# Patient Record
Sex: FEMALE | Race: WHITE | Employment: FULL TIME | URBAN - METROPOLITAN AREA
[De-identification: names, ages, dates, MRNs, and addresses within clinical notes are randomized per-mention and may not be internally consistent; named-entity substitution may affect disease eponyms.]

---

## 2017-02-07 ENCOUNTER — OFFICE VISIT (OUTPATIENT)
Dept: PRIMARY CARE CLINIC | Age: 57
End: 2017-02-07

## 2017-02-07 VITALS
BODY MASS INDEX: 24.46 KG/M2 | SYSTOLIC BLOOD PRESSURE: 115 MMHG | RESPIRATION RATE: 16 BRPM | WEIGHT: 124.6 LBS | TEMPERATURE: 98.2 F | DIASTOLIC BLOOD PRESSURE: 74 MMHG | HEART RATE: 101 BPM | OXYGEN SATURATION: 99 % | HEIGHT: 60 IN

## 2017-02-07 DIAGNOSIS — K52.9 ACUTE GASTROENTERITIS: Primary | ICD-10-CM

## 2017-02-07 RX ORDER — ONDANSETRON 4 MG/1
4 TABLET, ORALLY DISINTEGRATING ORAL
Qty: 15 TAB | Refills: 0 | Status: SHIPPED | OUTPATIENT
Start: 2017-02-07 | End: 2017-06-13

## 2017-02-07 NOTE — PATIENT INSTRUCTIONS
GENERAL INSTRUCTIONS:  1. Plenty of fluids:    Infants: Pedialyte    Adults: Water, Gatorade, decaffeinated defizzed soda, ice chips, etc.    Wait 30-60 min after vomiting to try again. 2. As symptoms subsides, advance food as tolerated to bananas, rice, applesauce, toast, tea, and then to a regular diet or full-strength formula  3. No milk, meat, fried/fatty products, aspirin or ibuprofen for several days. Return to clinic or go to the Emergency Room if you have increased diarrhea, vomiting, fever, pain, swelling of the abdomen, bloody stool or vomitus, or signs of dehydration: decreased urination, dry mouth, fatigue, lightheaded, (suken soft spot, no tears, dry diapers in infants) or no improvement in 48 hours . Gastroenteritis: Care Instructions  Your Care Instructions  Gastroenteritis is an illness that may cause nausea, vomiting, and diarrhea. It is sometimes called \"stomach flu. \" It can be caused by bacteria or a virus. You will probably begin to feel better in 1 to 2 days. In the meantime, get plenty of rest and make sure you do not become dehydrated. Dehydration occurs when your body loses too much fluid. Follow-up care is a key part of your treatment and safety. Be sure to make and go to all appointments, and call your doctor if you are having problems. Its also a good idea to know your test results and keep a list of the medicines you take. How can you care for yourself at home? · If your doctor prescribed antibiotics, take them as directed. Do not stop taking them just because you feel better. You need to take the full course of antibiotics. · Drink plenty of fluids to prevent dehydration, enough so that your urine is light yellow or clear like water. Choose water and other caffeine-free clear liquids until you feel better. If you have kidney, heart, or liver disease and have to limit fluids, talk with your doctor before you increase your fluid intake.   · Drink fluids slowly, in frequent, small amounts, because drinking too much too fast can cause vomiting. · Begin eating mild foods, such as dry toast, yogurt, applesauce, bananas, and rice. Avoid spicy, hot, or high-fat foods, and do not drink alcohol or caffeine for a day or two. Do not drink milk or eat ice cream until you are feeling better. How to prevent gastroenteritis  · Keep hot foods hot and cold foods cold. · Do not eat meats, dressings, salads, or other foods that have been kept at room temperature for more than 2 hours. · Use a thermometer to check your refrigerator. It should be between 34°F and 40°F.  · Defrost meats in the refrigerator or microwave, not on the kitchen counter. · Keep your hands and your kitchen clean. Wash your hands, cutting boards, and countertops with hot soapy water frequently. · Cook meat until it is well done. · Do not eat raw eggs or uncooked sauces made with raw eggs. · Do not take chances. If food looks or tastes spoiled, throw it out. When should you call for help? Call 911 anytime you think you may need emergency care. For example, call if:  · You vomit blood or what looks like coffee grounds. · You passed out (lost consciousness). · You pass maroon or very bloody stools. Call your doctor now or seek immediate medical care if:  · You have severe belly pain. · You have signs of needing more fluids. You have sunken eyes, a dry mouth, and pass only a little dark urine. · You feel like you are going to faint. · You have increased belly pain that does not go away in 1 to 2 days. · You have new or increased nausea, or you are vomiting. · You have a new or higher fever. · Your stools are black and tarlike or have streaks of blood. Watch closely for changes in your health, and be sure to contact your doctor if:  · You are dizzy or lightheaded. · You urinate less than usual, or your urine is dark yellow or brown. · You do not feel better with each day that goes by.   Where can you learn more?  Go to http://dong-stanislaw.info/. Enter N142 in the search box to learn more about \"Gastroenteritis: Care Instructions. \"  Current as of: May 24, 2016  Content Version: 11.1  © 6023-1449 [employer], [employer]. Care instructions adapted under license by [employer] (which disclaims liability or warranty for this information). If you have questions about a medical condition or this instruction, always ask your healthcare professional. [address] any warranty or liability for your use of this information.

## 2017-02-07 NOTE — PROGRESS NOTES
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

## 2017-02-07 NOTE — MR AVS SNAPSHOT
Visit Information Date & Time Provider Department Dept. Phone Encounter #  
 2/7/2017 12:30 PM Мария Marinoplacido , [address] [phone] [id number] Your Appointments 6/13/2017  8:30 AM  
Any with Mark George MD  
[facility] Gynecologic Oncology [facility]) Appt Note: 6 mo f/u  
 [address] [phone]  
[address] [place] [address] Upcoming Health Maintenance Date Due Hepatitis C Screening 1960 DTaP/Tdap/Td series (1 - Tdap) 10/26/1980 FOBT Q 1 YEAR AGE 50-75 10/26/2009 BREAST CANCER SCRN MAMMOGRAM 6/30/2018 PAP AKA CERVICAL CYTOLOGY 12/13/2019 Allergies as of 2/7/2017  Review Complete On: 2/7/2017 By: Мария Morales MD  
 No Known Allergies Current Immunizations  Never Reviewed No immunizations on file. Not reviewed this visit You Were Diagnosed With   
  
 Codes Comments Acute gastroenteritis    -  Primary ICD-10-CM: K52.9 ICD-9-CM: 558. 9 Vitals BP Pulse Temp Resp Height(growth percentile) Weight(growth percentile) 115/74 (BP 1 Location: Right arm, BP Patient Position: Sitting) (!) 101 98.2 °F (36.8 °C) (Oral) 16 5' (1.524 m) 124 lb 9.6 oz (56.5 kg) SpO2 BMI OB Status Smoking Status 99% 24.33 kg/m2 Hysterectomy Former Smoker BMI and BSA Data Body Mass Index Body Surface Area  
 24.33 kg/m 2 1.55 m 2 Preferred Pharmacy Pharmacy Name Phone [address] [place] [address], [address] [place] [address] Jayla Gaytan Said [phone] Your Updated Medication List  
  
   
This list is accurate as of: 2/7/17 12:57 PM.  Always use your most recent med list.  
  
  
  
  
 guaiFENesin-codeine 100-10 mg/5 mL solution Commonly known as:  Bobby Manas Take 5 mL by mouth three (3) times daily as needed for Cough. Max Daily Amount: 15 mL.  
  
 multivitamin tablet Commonly known as:  ONE A DAY Take 1 Tab by mouth daily. NAPROSYN 500 mg tablet Generic drug:  naproxen Take 500 mg by mouth two (2) times daily (with meals). ondansetron 4 mg disintegrating tablet Commonly known as:  ZOFRAN ODT Take 1 Tab by mouth every eight (8) hours as needed for Nausea. Prescriptions Sent to Pharmacy Refills  
 ondansetron (ZOFRAN ODT) 4 mg disintegrating tablet 0 Sig: Take 1 Tab by mouth every eight (8) hours as needed for Nausea. Class: Normal  
 Pharmacy: [facility] at [address] #: [phone] Route: Oral  
  
Patient Instructions GENERAL INSTRUCTIONS: 
1. Plenty of fluids: 
  Infants: Pedialyte Adults: Water, Gatorade, decaffeinated defizzed soda, ice chips, etc. 
  Wait 30-60 min after vomiting to try again. 2. As symptoms subsides, advance food as tolerated to bananas, rice, applesauce, toast, tea, and then to a regular diet or full-strength formula 3. No milk, meat, fried/fatty products, aspirin or ibuprofen for several days. Return to clinic or go to the Emergency Room if you have increased diarrhea, vomiting, fever, pain, swelling of the abdomen, bloody stool or vomitus, or signs of dehydration: decreased urination, dry mouth, fatigue, lightheaded, (suken soft spot, no tears, dry diapers in infants) or no improvement in 48 hours . Gastroenteritis: Care Instructions Your Care Instructions Gastroenteritis is an illness that may cause nausea, vomiting, and diarrhea. It is sometimes called \"stomach flu. \" It can be caused by bacteria or a virus. You will probably begin to feel better in 1 to 2 days. In the meantime, get plenty of rest and make sure you do not become dehydrated. Dehydration occurs when your body loses too much fluid. Follow-up care is a key part of your treatment and safety.  Be sure to make and go to all appointments, and call your doctor if you are having problems. Its also a good idea to know your test results and keep a list of the medicines you take. How can you care for yourself at home? · If your doctor prescribed antibiotics, take them as directed. Do not stop taking them just because you feel better. You need to take the full course of antibiotics. · Drink plenty of fluids to prevent dehydration, enough so that your urine is light yellow or clear like water. Choose water and other caffeine-free clear liquids until you feel better. If you have kidney, heart, or liver disease and have to limit fluids, talk with your doctor before you increase your fluid intake. · Drink fluids slowly, in frequent, small amounts, because drinking too much too fast can cause vomiting. · Begin eating mild foods, such as dry toast, yogurt, applesauce, bananas, and rice. Avoid spicy, hot, or high-fat foods, and do not drink alcohol or caffeine for a day or two. Do not drink milk or eat ice cream until you are feeling better. How to prevent gastroenteritis · Keep hot foods hot and cold foods cold. · Do not eat meats, dressings, salads, or other foods that have been kept at room temperature for more than 2 hours. · Use a thermometer to check your refrigerator. It should be between 34°F and 40°F. 
· Defrost meats in the refrigerator or microwave, not on the kitchen counter. · Keep your hands and your kitchen clean. Wash your hands, cutting boards, and countertops with hot soapy water frequently. · Cook meat until it is well done. · Do not eat raw eggs or uncooked sauces made with raw eggs. · Do not take chances. If food looks or tastes spoiled, throw it out. When should you call for help? Call 911 anytime you think you may need emergency care. For example, call if: 
· You vomit blood or what looks like coffee grounds. · You passed out (lost consciousness). · You pass maroon or very bloody stools. Call your doctor now or seek immediate medical care if: 
· You have severe belly pain. · You have signs of needing more fluids. You have sunken eyes, a dry mouth, and pass only a little dark urine. · You feel like you are going to faint. · You have increased belly pain that does not go away in 1 to 2 days. · You have new or increased nausea, or you are vomiting. · You have a new or higher fever. · Your stools are black and tarlike or have streaks of blood. Watch closely for changes in your health, and be sure to contact your doctor if: 
· You are dizzy or lightheaded. · You urinate less than usual, or your urine is dark yellow or brown. · You do not feel better with each day that goes by. Where can you learn more? Go to http://dong-stanislaw.info/. Enter N142 in the search box to learn more about \"Gastroenteritis: Care Instructions. \" Current as of: May 24, 2016 Content Version: 11.1 © 6576-5432 Panna. Care instructions adapted under license by [employer] (which disclaims liability or warranty for this information). If you have questions about a medical condition or this instruction, always ask your healthcare professional. [address] any warranty or liability for your use of this information. Introducing [facility]! Dear Carl Cotton: 
Thank you for requesting a Wylio account. Our records indicate that you already have an active Wylio account. You can access your account anytime at [email or web address] Did you know that you can access your hospital and ER discharge instructions at any time in Wylio? You can also review all of your test results from your hospital stay or ER visit. Additional Information If you have questions, please visit the Frequently Asked Questions section of the Wylio website at [email or web address] Remember, RPosthart is NOT to be used for urgent needs. For medical emergencies, dial 911. Now available from your iPhone and Android! Please provide this summary of care documentation to your next provider. Your primary care clinician is listed as Teresa Mcdaniel. If you have any questions after today's visit, please call [phone].

## 2017-02-07 NOTE — LETTER
NOTIFICATION RETURN TO WORK / SCHOOL 
 
2/7/2017 12:57 PM 
 
Ms. David Gee [employer]/ [address] [id number] To Whom It May Concern: 
 
David Gee is currently under the care of [address] [place] [address]. She will return to work/school on: 02/10/2017 If there are questions or concerns please have the patient contact our office.  
 
 
 
Sincerely, 
 
 
Tamera Angel MD

## 2017-06-13 ENCOUNTER — HOSPITAL ENCOUNTER (OUTPATIENT)
Dept: LAB | Age: 57
Discharge: HOME | End: 2017-06-13
Payer: COMMERCIAL

## 2017-06-13 ENCOUNTER — OFFICE VISIT (OUTPATIENT)
Dept: GYNECOLOGY | Age: 57
End: 2017-06-13

## 2017-06-13 VITALS
HEIGHT: 60 IN | SYSTOLIC BLOOD PRESSURE: 117 MMHG | WEIGHT: 129 LBS | HEART RATE: 91 BPM | BODY MASS INDEX: 25.32 KG/M2 | DIASTOLIC BLOOD PRESSURE: 74 MMHG

## 2017-06-13 DIAGNOSIS — Z86.001 HISTORY OF CARCINOMA IN SITU OF CERVIX: Primary | ICD-10-CM

## 2017-06-13 DIAGNOSIS — R87.629 ABNORMAL VAGINAL CYTOLOGY: ICD-10-CM

## 2017-06-13 PROCEDURE — 88142 CYTOPATH C/V THIN LAYER: CPT | Performed by: OBSTETRICS & GYNECOLOGY

## 2017-06-13 NOTE — PROGRESS NOTES
[facility] [address], [address]  [phone]  Dr. Bhupendra Sagastume, III    Dr. Deepak Hidalgo, Patricia Fonseca, [facility]     Office Note  Patient ID:  Name: Mateo Porter  MRM: [id number]  : 1960/56 y.o. Date: 2017    Diagnosis:     ICD-10-CM ICD-9-CM    1. History of carcinoma in situ of cervix Z86.008 V13.89 PAP, LIQUID BASED, MANUAL SCREEN   2. Abnormal vaginal cytology R89.6 795.10 PAP, LIQUID BASED, MANUAL SCREEN       Problem List:   Patient Active Problem List   Diagnosis Code    Abnormal cytology R89.6    Pelvic mass R19.00       SUBJECTIVE:    Mateo Porter is a 61 y.o. female who presents for followupcare following TLH/BSO, 2015,     The patient's pathology revealed   FINAL PATHOLOGIC DIAGNOSIS  Uterus, bilateral fallopian tubes and ovaries, hysterectomy and bilateral salpingo-oophorectomy:  Uterus:  Cervix: Unremarkable. Endometrium: Atrophic changes. Myometrium: Cellular leiomyoma. Ovaries, bilateral:  Right ovary: Atrophic changes. Serous inclusion cyst.  Left ovary: Serous cystadenofibroma. Fallopian tubes, bilateral:  Right fallopian tube: Benign paratubal cyst.  Left fallopian tube: Benign paratubal cyst.    12/15/2017:  Cytology   ASCUS  2016:  Cytology   Negative  Vaginal Biopsy:   Benign polyp, HPV effect    The patient presents today at a six month interval in benign followup. Currently she has no problems with eating, bowel movements, voiding  Appetite is good. Eating a regular diet without difficulty. Bowel movements are regular. The patient is not having any pain. .    Medications:     Current Outpatient Prescriptions on File Prior to Visit   Medication Sig Dispense Refill    naproxen (NAPROSYN) 500 mg tablet Take 500 mg by mouth two (2) times daily (with meals).  multivitamin (ONE A DAY) tablet Take 1 Tab by mouth daily.        No current facility-administered medications on file prior to visit. Allergies:   No Known Allergies  Past Medical History:   Diagnosis Date    Abnormal Pap smear of cervix 2014    ASCUS, HPV +, ? HGSIL, colpo bx negative, LEEP negative    Abnormal Pap smear of cervix 1/2015    HPV +, 16/18/45 negative    Ill-defined condition     recent bout of Bronchitis     Past Surgical History:   Procedure Laterality Date    BREAST SURGERY PROCEDURE UNLISTED      breast biopsy left 1995    HX BREAST BIOPSY  6/21/16    benign per pt    HX GYN      2 vaginal births    HX GYN      tubal ligation    HX GYN      hysterectomy 2015    HX LEEP PROCEDURE  6/26/14     Social History     Social History    Marital status:      Spouse name: N/A    Number of children: N/A    Years of education: N/A     Occupational History    Not on file. Social History Main Topics    Smoking status: Former Smoker    Smokeless tobacco: Never Used    Alcohol use No    Drug use: No    Sexual activity: Not on file     Other Topics Concern    Not on file     Social History Narrative       OBJECTIVE:    Vitals:   Vitals:    06/13/17 0842   BP: 117/74   Pulse: 91   Weight: 129 lb (58.5 kg)   Height: 5' (1.524 m)     Physical Examination:     General:  no distress   Lungs: lungs clear to auscultation and auscultation   Cardiac: Regular rate and rhythm   Abdomen: soft, bowel sounds active, non-tender  No CVA tenderness   Incision: healing well   Pelvic: Vulva: No gross lesion. BUS negative  Vagina: Again no gross lesion, no suspicious induration or nodularity. Cytology taken without friability. Bimanual Exam: No suspicious masses, induration or nodularity. Rectal: not done   Extremity:   extremities normal, atraumatic, no cyanosis or edema       IMPRESSION:    Dinorah Velazquez is doing well. .  She has a working diagnosis of:    ICD-10-CM ICD-9-CM    1. History of carcinoma in situ of cervix Z86.008 V13.89 PAP, LIQUID BASED, MANUAL SCREEN   2.  Abnormal vaginal cytology R89.6 795.10 PAP, LIQUID BASED, MANUAL SCREEN    . Medical problems:     Patient Active Problem List   Diagnosis Code    Abnormal cytology R89.6    Pelvic mass R19.00       PLAN:    Cytology taken  Return six months pending cytology. Liseth Alvarado MD  6/13/2017/10:13 AM    Defined Sensitive Document    CC:   Reji Ogden MD   No ref.  provider found

## 2017-06-13 NOTE — PROGRESS NOTES
6 month check up, pt complains of a tan discharge that comes and goes, Patient states she is no longer taking the following medications: Naproxen

## 2017-06-29 NOTE — PROGRESS NOTES
Patient notified of results, pt verbalized understanding of results and MD's recommendations, pt already has an appt on 12/12/17, will repeat at that visit, tickled for follow up

## 2017-08-17 ENCOUNTER — OFFICE VISIT (OUTPATIENT)
Dept: PRIMARY CARE CLINIC | Age: 57
End: 2017-08-17

## 2017-08-17 VITALS
OXYGEN SATURATION: 98 % | BODY MASS INDEX: 25.17 KG/M2 | HEIGHT: 60 IN | TEMPERATURE: 98.2 F | WEIGHT: 128.2 LBS | HEART RATE: 88 BPM | SYSTOLIC BLOOD PRESSURE: 125 MMHG | RESPIRATION RATE: 16 BRPM | DIASTOLIC BLOOD PRESSURE: 78 MMHG

## 2017-08-17 DIAGNOSIS — R53.83 FATIGUE, UNSPECIFIED TYPE: ICD-10-CM

## 2017-08-17 DIAGNOSIS — R05.9 COUGH: Primary | ICD-10-CM

## 2017-08-17 RX ORDER — HYDROCODONE POLISTIREX AND CHLORPHENIRAMINE POLISTIREX 10; 8 MG/5ML; MG/5ML
5 SUSPENSION, EXTENDED RELEASE ORAL
Qty: 60 ML | Refills: 0 | Status: SHIPPED | OUTPATIENT
Start: 2017-08-17 | End: 2017-12-18

## 2017-08-17 RX ORDER — FLUTICASONE PROPIONATE AND SALMETEROL 250; 50 UG/1; UG/1
1 POWDER RESPIRATORY (INHALATION) 2 TIMES DAILY
Qty: 1 INHALER | Refills: 0 | Status: SHIPPED | OUTPATIENT
Start: 2017-08-17 | End: 2017-08-31

## 2017-08-17 RX ORDER — PREDNISONE 10 MG/1
10 TABLET ORAL DAILY
Qty: 45 TAB | Refills: 0 | Status: SHIPPED | OUTPATIENT
Start: 2017-08-17 | End: 2017-08-31 | Stop reason: ALTCHOICE

## 2017-08-17 NOTE — PROGRESS NOTES
Chief Complaint   Patient presents with    Cold Symptoms     c/o continued cough and feeling lethargic, states she was seen at [facility], was placed on abx and prednisone at [facility], 2 weeks ago, has been taking otc Mucincex DM & otc cough syrup to help with discomfort        This note will not be viewable in MyChart.

## 2017-08-17 NOTE — PATIENT INSTRUCTIONS
Cough: Care Instructions  Your Care Instructions  A cough is your body's response to something that bothers your throat or airways. Many things can cause a cough. You might cough because of a cold or the flu, bronchitis, or asthma. Smoking, postnasal drip, allergies, and stomach acid that backs up into your throat also can cause coughs. A cough is a symptom, not a disease. Most coughs stop when the cause, such as a cold, goes away. You can take a few steps at home to cough less and feel better. Follow-up care is a key part of your treatment and safety. Be sure to make and go to all appointments, and call your doctor if you are having problems. It's also a good idea to know your test results and keep a list of the medicines you take. How can you care for yourself at home? · Drink lots of water and other fluids. This helps thin the mucus and soothes a dry or sore throat. Honey or lemon juice in hot water or tea may ease a dry cough. · Take cough medicine as directed by your doctor. · Prop up your head on pillows to help you breathe and ease a dry cough. · Try cough drops to soothe a dry or sore throat. Cough drops don't stop a cough. Medicine-flavored cough drops are no better than candy-flavored drops or hard candy. · Do not smoke. Avoid secondhand smoke. If you need help quitting, talk to your doctor about stop-smoking programs and medicines. These can increase your chances of quitting for good. When should you call for help? Call 911 anytime you think you may need emergency care. For example, call if:  · You have severe trouble breathing. Call your doctor now or seek immediate medical care if:  · You cough up blood. · You have new or worse trouble breathing. · You have a new or higher fever. · You have a new rash.   Watch closely for changes in your health, and be sure to contact your doctor if:  · You cough more deeply or more often, especially if you notice more mucus or a change in the color of your mucus. · You have new symptoms, such as a sore throat, an earache, or sinus pain. · You do not get better as expected. Where can you learn more? Go to http://dong-stanislaw.info/. Enter D279 in the search box to learn more about \"Cough: Care Instructions. \"  Current as of: March 25, 2017  Content Version: 11.3  © 0376-9786 [employer]ush. Care instructions adapted under license by [employer] (which disclaims liability or warranty for this information). If you have questions about a medical condition or this instruction, always ask your healthcare professional. [address] any warranty or liability for your use of this information.

## 2017-08-17 NOTE — MR AVS SNAPSHOT
Visit Information Date & Time Provider Department Dept. Phone Encounter #  
 8/17/2017 11:00 AM Valentine Robb NP [address] [address] [phone] [id number] Follow-up Instructions Return if symptoms worsen or fail to improve. Your Appointments 12/12/2017 10:30 AM  
6 MONTH with David Schroeder MD  
[facility] Gynecologic Oncology [address] [facility]) Appt Note: 6mo 200 [facility] [address] [phone]  
[address] Upcoming Health Maintenance Date Due Hepatitis C Screening 1960 DTaP/Tdap/Td series (1 - Tdap) 10/26/1980 FOBT Q 1 YEAR AGE 50-75 10/26/2009 INFLUENZA AGE 9 TO ADULT 8/1/2017 BREAST CANCER SCRN MAMMOGRAM 6/30/2018 PAP AKA CERVICAL CYTOLOGY 6/13/2020 Allergies as of 8/17/2017  Review Complete On: 8/17/2017 By: Valentine Robb NP No Known Allergies Current Immunizations  Never Reviewed No immunizations on file. Not reviewed this visit You Were Diagnosed With   
  
 Codes Comments Cough    -  Primary ICD-10-CM: B76 ICD-9-CM: 563. 2 Vitals BP Pulse Temp Resp Height(growth percentile) Weight(growth percentile) 125/78 (BP 1 Location: Left arm, BP Patient Position: Sitting) 88 98.2 °F (36.8 °C) (Oral) 16 5' (1.524 m) 128 lb 3.2 oz (58.2 kg) SpO2 BMI OB Status Smoking Status 98% 25.04 kg/m2 Hysterectomy Former Smoker BMI and BSA Data Body Mass Index Body Surface Area 25.04 kg/m 2 1.57 m 2 Preferred Pharmacy Pharmacy Name Phone [facility], [address] [place] [address] Jayla Gaytan Said [phone] Your Updated Medication List  
  
   
This list is accurate as of: 8/17/17 12:01 PM.  Always use your most recent med list.  
  
  
  
  
 chlorpheniramine-HYDROcodone 10-8 mg/5 mL suspension Commonly known as:  Quirino Momin Take 5 mL by mouth every twelve (12) hours as needed for Cough. Max Daily Amount: 10 mL. fluticasone-salmeterol 250-50 mcg/dose diskus inhaler Commonly known as:  ADVAIR Take 1 Puff by inhalation two (2) times a day. multivitamin tablet Commonly known as:  ONE A DAY Take 1 Tab by mouth daily. NAPROSYN 500 mg tablet Generic drug:  naproxen Take 500 mg by mouth two (2) times daily (with meals). predniSONE 10 mg tablet Commonly known as:  Seng Ferraris Take 1 Tab by mouth daily. 5 tabs daily x 3 d, then taper by one tab every 3 days until finished Prescriptions Printed Refills  
 chlorpheniramine-HYDROcodone (TUSSIONEX) 10-8 mg/5 mL suspension 0 Sig: Take 5 mL by mouth every twelve (12) hours as needed for Cough. Max Daily Amount: 10 mL. Class: Print Route: Oral  
  
Prescriptions Sent to Pharmacy Refills  
 predniSONE (DELTASONE) 10 mg tablet 0 Sig: Take 1 Tab by mouth daily. 5 tabs daily x 3 d, then taper by one tab every 3 days until finished Class: Normal  
 Pharmacy: [facility]  at [address] Ph #: [phone] Route: Oral  
 fluticasone-salmeterol (ADVAIR) 250-50 mcg/dose diskus inhaler 0 Sig: Take 1 Puff by inhalation two (2) times a day. Class: Normal  
 Pharmacy: [place] [address] at [address] Ph #: [phone] Route: Inhalation Follow-up Instructions Return if symptoms worsen or fail to improve. To-Do List   
 08/17/2017 Imaging:  XR CHEST PA LAT Patient Instructions Cough: Care Instructions Your Care Instructions A cough is your body's response to something that bothers your throat or airways. Many things can cause a cough. You might cough because of a cold or the flu, bronchitis, or asthma.  Smoking, postnasal drip, allergies, and stomach acid that backs up into your throat also can cause coughs. A cough is a symptom, not a disease. Most coughs stop when the cause, such as a cold, goes away. You can take a few steps at home to cough less and feel better. Follow-up care is a key part of your treatment and safety. Be sure to make and go to all appointments, and call your doctor if you are having problems. It's also a good idea to know your test results and keep a list of the medicines you take. How can you care for yourself at home? · Drink lots of water and other fluids. This helps thin the mucus and soothes a dry or sore throat. Honey or lemon juice in hot water or tea may ease a dry cough. · Take cough medicine as directed by your doctor. · Prop up your head on pillows to help you breathe and ease a dry cough. · Try cough drops to soothe a dry or sore throat. Cough drops don't stop a cough. Medicine-flavored cough drops are no better than candy-flavored drops or hard candy. · Do not smoke. Avoid secondhand smoke. If you need help quitting, talk to your doctor about stop-smoking programs and medicines. These can increase your chances of quitting for good. When should you call for help? Call 911 anytime you think you may need emergency care. For example, call if: 
· You have severe trouble breathing. Call your doctor now or seek immediate medical care if: 
· You cough up blood. · You have new or worse trouble breathing. · You have a new or higher fever. · You have a new rash. Watch closely for changes in your health, and be sure to contact your doctor if: 
· You cough more deeply or more often, especially if you notice more mucus or a change in the color of your mucus. · You have new symptoms, such as a sore throat, an earache, or sinus pain. · You do not get better as expected. Where can you learn more? Go to http://dong-stanislaw.info/.  
Enter D209 in the search box to learn more about \"Cough: Care Instructions. \" Current as of: March 25, 2017 Content Version: 11.3 © 6046-4878 Mind Candy. Care instructions adapted under license by Snacksquare (which disclaims liability or warranty for this information). If you have questions about a medical condition or this instruction, always ask your healthcare professional. [address] any warranty or liability for your use of this information. Introducing [facility] & HEALTH SERVICES! Dear Shagufta Quinones: 
Thank you for requesting a Pact account. Our records indicate that you already have an active Pact account. You can access your account anytime at [email or web address] Did you know that you can access your hospital and ER discharge instructions at any time in Pact? You can also review all of your test results from your hospital stay or ER visit. Additional Information If you have questions, please visit the Frequently Asked Questions section of the Pact website at [email or web address] Remember, Pact is NOT to be used for urgent needs. For medical emergencies, dial 911. Now available from your iPhone and Android! Please provide this summary of care documentation to your next provider. Your primary care clinician is listed as Tonny Landing. If you have any questions after today's visit, please call [phone].

## 2017-08-17 NOTE — PROGRESS NOTES
Subjective:   Peewee Paul is a 61 y.o. female who complains of being sick for about 6 weeks. Symptoms started July 4 with sore throat, head congestion, and cough. About a week later while in [address] [place] [address] she went to Urgent Care for continued symptoms. They recommended she take Zyrtec D, rx Codeine cough syrup, and advised her to seek further care if symptoms persisted after a week. After another week, symptoms persisted and she was seen at [facility] in [place]. She was rx'd Doxycycline for 10 days, cough pills that don't work (Tessalon?), and Prednisone taper (5 days?). She did not have CXR at either  visit. 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 She continues to have coughing (described as hacking, occas prod of clear phlegm), fatigue (\"extreme exhaustion\"), and more recently reports low grade fevers (Tmax 100). States she gets SOB at times. Denies any wheezing. She denies any history of asthma or COPD. Most of her life has been a non-smoker, smoked in college. She did have SOB and similar cough in 2015. There was concern for PE but had normal chest CT. She was referred to Pulmonology but after couple weeks symptoms resolved spontaneously. States she had Tdap ~5 years ago. Past Medical History:   Diagnosis Date    Abnormal Pap smear of cervix 2014    ASCUS, HPV +, ? HGSIL, colpo bx negative, LEEP negative    Abnormal Pap smear of cervix 1/2015    HPV +, 16/18/45 negative    Ill-defined condition     recent bout of Bronchitis     Past Surgical History:   Procedure Laterality Date    BREAST SURGERY PROCEDURE UNLISTED      breast biopsy left 1995    HX BREAST BIOPSY  6/21/16    benign per pt    HX GYN      2 vaginal births    HX GYN      tubal ligation    HX GYN      hysterectomy 2015    HX LEEP PROCEDURE  6/26/14     No Known Allergies      Review of Systems  Pertinent items are noted in HPI.     Objective:     Visit Vitals    /78 (BP 1 Location: Left arm, BP Patient Position: Sitting)    Pulse 88    Temp 98.2 °F (36.8 °C) (Oral)    Resp 16    Ht 5' (1.524 m)    Wt 128 lb 3.2 oz (58.2 kg)    SpO2 98%    BMI 25.04 kg/m2     General:  alert, cooperative, no distress   Eyes: negative   Ears: normal TM's and external ear canals AU   Sinuses: Normal paranasal sinuses without tenderness   Mouth:  Lips, mucosa, and tongue normal. Teeth and gums normal and normal findings: oropharynx pink & moist without lesions or evidence of thrush   Neck: supple, symmetrical, trachea midline and no adenopathy. Heart: S1 and S2 normal, no murmurs noted. Lungs: clear to auscultation bilaterally, faint wheeze left? Assessment/Plan:       ICD-10-CM ICD-9-CM    1. Cough R05 786.2 XR CHEST PA LAT      CBC WITH AUTOMATED DIFF      METABOLIC PANEL, COMPREHENSIVE   2. Fatigue, unspecified type R53.83 780.79 CBC WITH AUTOMATED DIFF      METABOLIC PANEL, COMPREHENSIVE     Orders Placed This Encounter    XR CHEST PA LAT     Standing Status:   Future     Number of Occurrences:   1     Standing Expiration Date:   9/17/2018     Order Specific Question:   Reason for Exam     Answer:   cough for 6 weeks, fever    CBC WITH AUTOMATED DIFF    METABOLIC PANEL, COMPREHENSIVE    predniSONE (DELTASONE) 10 mg tablet     Sig: Take 1 Tab by mouth daily. 5 tabs daily x 3 d, then taper by one tab every 3 days until finished     Dispense:  45 Tab     Refill:  0    chlorpheniramine-HYDROcodone (TUSSIONEX) 10-8 mg/5 mL suspension     Sig: Take 5 mL by mouth every twelve (12) hours as needed for Cough. Max Daily Amount: 10 mL. Dispense:  60 mL     Refill:  0    fluticasone-salmeterol (ADVAIR) 250-50 mcg/dose diskus inhaler     Sig: Take 1 Puff by inhalation two (2) times a day. Dispense:  1 Inhaler     Refill:  0   Tussionex - precautions given. Do not take with other codeine cough syrups. Suggested symptomatic OTC remedies.   RTC reji.      Ever Craft NP  This note will not be viewable in [address]

## 2017-08-18 LAB
ALBUMIN SERPL-MCNC: 4.3 G/DL (ref 3.5–5.5)
ALBUMIN/GLOB SERPL: 1.7 {RATIO} (ref 1.2–2.2)
ALP SERPL-CCNC: 80 IU/L (ref 39–117)
ALT SERPL-CCNC: 12 IU/L (ref 0–32)
AST SERPL-CCNC: 20 IU/L (ref 0–40)
BASOPHILS # BLD AUTO: 0 X10E3/UL (ref 0–0.2)
BASOPHILS NFR BLD AUTO: 1 %
BILIRUB SERPL-MCNC: 0.7 MG/DL (ref 0–1.2)
BUN SERPL-MCNC: 13 MG/DL (ref 6–24)
BUN/CREAT SERPL: 13 (ref 9–23)
CALCIUM SERPL-MCNC: 9 MG/DL (ref 8.7–10.2)
CHLORIDE SERPL-SCNC: 100 MMOL/L (ref 96–106)
CO2 SERPL-SCNC: 22 MMOL/L (ref 18–29)
CREAT SERPL-MCNC: 0.97 MG/DL (ref 0.57–1)
EOSINOPHIL # BLD AUTO: 0.1 X10E3/UL (ref 0–0.4)
EOSINOPHIL NFR BLD AUTO: 3 %
ERYTHROCYTE [DISTWIDTH] IN BLOOD BY AUTOMATED COUNT: 14.7 % (ref 12.3–15.4)
GLOBULIN SER CALC-MCNC: 2.6 G/DL (ref 1.5–4.5)
GLUCOSE SERPL-MCNC: 82 MG/DL (ref 65–99)
HCT VFR BLD AUTO: 38 % (ref 34–46.6)
HGB BLD-MCNC: 12.1 G/DL (ref 11.1–15.9)
IMM GRANULOCYTES # BLD: 0 X10E3/UL (ref 0–0.1)
IMM GRANULOCYTES NFR BLD: 0 %
LYMPHOCYTES # BLD AUTO: 1.1 X10E3/UL (ref 0.7–3.1)
LYMPHOCYTES NFR BLD AUTO: 25 %
MCH RBC QN AUTO: 28.1 PG (ref 26.6–33)
MCHC RBC AUTO-ENTMCNC: 31.8 G/DL (ref 31.5–35.7)
MCV RBC AUTO: 88 FL (ref 79–97)
MONOCYTES # BLD AUTO: 0.5 X10E3/UL (ref 0.1–0.9)
MONOCYTES NFR BLD AUTO: 12 %
NEUTROPHILS # BLD AUTO: 2.6 X10E3/UL (ref 1.4–7)
NEUTROPHILS NFR BLD AUTO: 59 %
PLATELET # BLD AUTO: 210 X10E3/UL (ref 150–379)
POTASSIUM SERPL-SCNC: 4.2 MMOL/L (ref 3.5–5.2)
PROT SERPL-MCNC: 6.9 G/DL (ref 6–8.5)
RBC # BLD AUTO: 4.3 X10E6/UL (ref 3.77–5.28)
SODIUM SERPL-SCNC: 139 MMOL/L (ref 134–144)
WBC # BLD AUTO: 4.5 X10E3/UL (ref 3.4–10.8)

## 2017-08-19 ENCOUNTER — TELEPHONE (OUTPATIENT)
Dept: PRIMARY CARE CLINIC | Age: 57
End: 2017-08-19

## 2017-08-19 NOTE — TELEPHONE ENCOUNTER
----- Message from Saima Pickering NP sent at 8/19/2017  7:56 AM EDT -----  Please inform pt that the CBC & CMP are both normal.  Thanks.

## 2017-08-31 ENCOUNTER — OFFICE VISIT (OUTPATIENT)
Dept: PRIMARY CARE CLINIC | Age: 57
End: 2017-08-31

## 2017-08-31 VITALS
HEART RATE: 84 BPM | SYSTOLIC BLOOD PRESSURE: 124 MMHG | BODY MASS INDEX: 25.32 KG/M2 | OXYGEN SATURATION: 98 % | WEIGHT: 129 LBS | RESPIRATION RATE: 18 BRPM | HEIGHT: 60 IN | TEMPERATURE: 97.8 F | DIASTOLIC BLOOD PRESSURE: 84 MMHG

## 2017-08-31 DIAGNOSIS — R05.3 CHRONIC COUGH: Primary | ICD-10-CM

## 2017-08-31 RX ORDER — FLUTICASONE FUROATE AND VILANTEROL 100; 25 UG/1; UG/1
1 POWDER RESPIRATORY (INHALATION) DAILY
Qty: 1 INHALER | Refills: 0 | Status: SHIPPED | OUTPATIENT
Start: 2017-08-31 | End: 2017-12-18

## 2017-08-31 NOTE — PROGRESS NOTES
Subjective:   Kimber Limon is a 61 y.o. female who complains of dry cough for ~ 2 months, unchanged since that time. See last office visit note. Pt reports persistent non-productive cough and headaches for almost 8 weeks. CXR and labs on last visit were normal. She had 15 day course of Prednisone without any improvement of cough. She is using Advair and taking cough syrup at needed. She denies a history of fevers, shortness of breath and wheezing. Evaluation to date: see notes. Treatment to date: see previous office visit note. Patient does not smoke cigarettes. Relevant PMH:   Past Medical History:   Diagnosis Date    Abnormal Pap smear of cervix 2014    ASCUS, HPV +, ? HGSIL, colpo bx negative, LEEP negative    Abnormal Pap smear of cervix 1/2015    HPV +, 16/18/45 negative    Ill-defined condition     recent bout of Bronchitis     Past Surgical History:   Procedure Laterality Date    BREAST SURGERY PROCEDURE UNLISTED      breast biopsy left 1995    HX BREAST BIOPSY  6/21/16    benign per pt    HX GYN      2 vaginal births    HX GYN      tubal ligation    HX GYN      hysterectomy 2015    HX LEEP PROCEDURE  6/26/14     No Known Allergies      Review of Systems  Pertinent items are noted in HPI. Objective:     Visit Vitals    /84    Pulse 84    Temp 97.8 °F (36.6 °C) (Oral)    Resp 18    Ht 5' (1.524 m)    Wt 129 lb (58.5 kg)    SpO2 98%    BMI 25.19 kg/m2     General:  alert, cooperative, no distress   Lungs: clear to auscultation bilaterally        Assessment/Plan:       ICD-10-CM ICD-9-CM    1. Chronic cough R05 786.2      Stop Advair, switch to Breo  Ref to Pulmonary. She saw Dr. Primo Briceno in 2015 with similar cough but cough resolved spontaneously at about same time of evaluation. Follow-up w/ Dr. Primo Briceno is recommended. RTC prn. Brice Gaitan NP  This note will not be viewable in [address]

## 2017-08-31 NOTE — PATIENT INSTRUCTIONS
Chronic Cough: Care Instructions  Your Care Instructions    A cough is your body's response to something that bothers your throat or airways. Many things can cause a cough. You might cough because of a cold or the flu, bronchitis, or asthma. Smoking, postnasal drip, allergies, and stomach acid that backs up into your throat also can cause a cough. A cough can be short-term (acute) or long-term (chronic). A chronic cough lasts more than 3 weeks. A chronic cough is often caused by a long-term problem, such as asthma. Another cause might be a medicine, such as an ACE inhibitor. A cough is a symptom, not a disease. To treat a chronic cough, you may need to treat the problem that causes it. You can take a few steps at home to cough less and feel better. Some people cough or clear their throat out of habit for no clear reason. Follow-up care is a key part of your treatment and safety. Be sure to make and go to all appointments, and call your doctor if you are having problems. It's also a good idea to know your test results and keep a list of the medicines you take. How can you care for yourself at home? · Drink plenty of water and other fluids. This may help soothe a dry or sore throat. Honey or lemon juice in hot water or tea may ease a dry cough. · Prop up your head on pillows to help you breathe and ease a cough. · Do not smoke or allow others to smoke around you. Smoke can make a cough worse. If you need help quitting, talk to your doctor about stop-smoking programs and medicines. These can increase your chances of quitting for good. · Avoid exposure to smoke, dust, or other pollutants, or wear a face mask. Check with your doctor or pharmacist to find out which type of face mask will give you the most benefit. · Take cough medicine as directed by your doctor. · Try cough drops to soothe a dry or sore throat. Cough drops don't stop a cough.  Medicine-flavored cough drops are no better than candy-flavored drops or hard candy. Throat clearing  When you have a chronic cough or a disease that may cause this type of cough, you may often feel like you want to clear your throat. This helps bring up mucus. But throat clearing does not always have a cause. Throat clearing can become a habit. The more you do it, the more you feel like you need to do it. But frequent throat clearing can be hard on your vocal cords. It's like slamming them together. To help lessen throat clearing, you can try:  · Taking small sips of water. · Not clearing your throat when you feel you need to. · Swallowing hard when you want to clear your throat. You may want to ask your doctor if a medicine that thins mucus would help. When should you call for help? Call 911 anytime you think you may need emergency care. For example, call if:  · You have severe trouble breathing. Call your doctor now or seek immediate medical care if:  · You cough up blood. · You have new or worse trouble breathing. · You have a new or higher fever. Watch closely for changes in your health, and be sure to contact your doctor if:  · You cough more deeply or more often, especially if you notice more mucus or a change in the color of your mucus. · You do not get better as expected. Where can you learn more? Go to http://dong-stanislaw.info/. Enter H132 in the search box to learn more about \"Chronic Cough: Care Instructions. \"  Current as of: March 25, 2017  Content Version: 11.3  © 0551-7420 Trilibis. Care instructions adapted under license by SafeBoot (which disclaims liability or warranty for this information). If you have questions about a medical condition or this instruction, always ask your healthcare professional. [address] any warranty or liability for your use of this information.

## 2017-08-31 NOTE — PROGRESS NOTES
Chief Complaint   Patient presents with    Cold Symptoms     cough and headache ongoing since last visit on 8/17/17, finished Prednisone today

## 2017-08-31 NOTE — MR AVS SNAPSHOT
Visit Information Date & Time Provider Department Dept. Phone Encounter #  
 8/31/2017 11:15 AM Nicholas Villarreal NP [address] [address] [phone] [id number] Your Appointments 12/12/2017 10:30 AM  
6 MONTH with Rosa Waller MD  
[facility] Gynecologic Oncology [address]) Appt Note: 6mo [address] [place] [address] [place] [address] [place] [address] [phone]  
[address] [place] [address] Upcoming Health Maintenance Date Due Hepatitis C Screening 1960 DTaP/Tdap/Td series (1 - Tdap) 10/26/1980 FOBT Q 1 YEAR AGE 50-75 10/26/2009 INFLUENZA AGE 9 TO ADULT 8/1/2017 BREAST CANCER SCRN MAMMOGRAM 6/30/2018 PAP AKA CERVICAL CYTOLOGY 6/13/2020 Allergies as of 8/31/2017  Review Complete On: 8/31/2017 By: Nicholas Villarreal NP No Known Allergies Current Immunizations  Never Reviewed No immunizations on file. Not reviewed this visit You Were Diagnosed With   
  
 Codes Comments Chronic cough    -  Primary ICD-10-CM: L08 ICD-9-CM: 594. 2 Vitals BP Pulse Temp Resp Height(growth percentile) Weight(growth percentile) 124/84 84 97.8 °F (36.6 °C) (Oral) 18 5' (1.524 m) 129 lb (58.5 kg) SpO2 BMI OB Status Smoking Status 98% 25.19 kg/m2 Hysterectomy Former Smoker BMI and BSA Data Body Mass Index Body Surface Area  
 25.19 kg/m 2 1.57 m 2 Preferred Pharmacy Pharmacy Name Phone [address] [place] [address], [address] [place] [address] Jayla Gaytan Said [phone] Your Updated Medication List  
  
   
This list is accurate as of: 8/31/17 11:49 AM.  Always use your most recent med list.  
  
  
  
  
 chlorpheniramine-HYDROcodone 10-8 mg/5 mL suspension Commonly known as:  Lalo Cloud Take 5 mL by mouth every twelve (12) hours as needed for Cough. Max Daily Amount: 10 mL. fluticasone-vilanterol 100-25 mcg/dose inhaler Commonly known as:  BREO ELLIPTA Take 1 Puff by inhalation daily. multivitamin tablet Commonly known as:  ONE A DAY Take 1 Tab by mouth daily. NAPROSYN 500 mg tablet Generic drug:  naproxen Take 500 mg by mouth two (2) times daily (with meals). Prescriptions Sent to Pharmacy Refills  
 fluticasone-vilanterol (BREO ELLIPTA) 100-25 mcg/dose inhaler 0 Sig: Take 1 Puff by inhalation daily. Class: Normal  
 Pharmacy: [facility] at [address] #: [phone] Route: Inhalation Patient Instructions Chronic Cough: Care Instructions Your Care Instructions A cough is your body's response to something that bothers your throat or airways. Many things can cause a cough. You might cough because of a cold or the flu, bronchitis, or asthma. Smoking, postnasal drip, allergies, and stomach acid that backs up into your throat also can cause a cough. A cough can be short-term (acute) or long-term (chronic). A chronic cough lasts more than 3 weeks. A chronic cough is often caused by a long-term problem, such as asthma. Another cause might be a medicine, such as an ACE inhibitor. A cough is a symptom, not a disease. To treat a chronic cough, you may need to treat the problem that causes it. You can take a few steps at home to cough less and feel better. Some people cough or clear their throat out of habit for no clear reason. Follow-up care is a key part of your treatment and safety. Be sure to make and go to all appointments, and call your doctor if you are having problems. It's also a good idea to know your test results and keep a list of the medicines you take. How can you care for yourself at home? · Drink plenty of water and other fluids. This may help soothe a dry or sore throat. Honey or lemon juice in hot water or tea may ease a dry cough. · Prop up your head on pillows to help you breathe and ease a cough. · Do not smoke or allow others to smoke around you. Smoke can make a cough worse. If you need help quitting, talk to your doctor about stop-smoking programs and medicines. These can increase your chances of quitting for good. · Avoid exposure to smoke, dust, or other pollutants, or wear a face mask. Check with your doctor or pharmacist to find out which type of face mask will give you the most benefit. · Take cough medicine as directed by your doctor. · Try cough drops to soothe a dry or sore throat. Cough drops don't stop a cough. Medicine-flavored cough drops are no better than candy-flavored drops or hard candy. Throat clearing When you have a chronic cough or a disease that may cause this type of cough, you may often feel like you want to clear your throat. This helps bring up mucus. But throat clearing does not always have a cause. Throat clearing can become a habit. The more you do it, the more you feel like you need to do it. But frequent throat clearing can be hard on your vocal cords. It's like slamming them together. To help lessen throat clearing, you can try: · Taking small sips of water. · Not clearing your throat when you feel you need to. · Swallowing hard when you want to clear your throat. You may want to ask your doctor if a medicine that thins mucus would help. When should you call for help? Call 911 anytime you think you may need emergency care. For example, call if: 
· You have severe trouble breathing. Call your doctor now or seek immediate medical care if: 
· You cough up blood. · You have new or worse trouble breathing. · You have a new or higher fever. Watch closely for changes in your health, and be sure to contact your doctor if: 
· You cough more deeply or more often, especially if you notice more mucus or a change in the color of your mucus. · You do not get better as expected. Where can you learn more? Go to http://dong-stanislaw.info/. Enter P294 in the search box to learn more about \"Chronic Cough: Care Instructions. \" Current as of: March 25, 2017 Content Version: 11.3 © 5405-2693 [employer]ion [employer]. Care instructions adapted under license by Seeo (which disclaims liability or warranty for this information). If you have questions about a medical condition or this instruction, always ask your healthcare professional. [address] any warranty or liability for your use of this information. Introducing [facility] & HEALTH SERVICES! Dear Suzan Hill: 
Thank you for requesting a ShipServ account. Our records indicate that you already have an active ShipServ account. You can access your account anytime at [email or web address] Did you know that you can access your hospital and ER discharge instructions at any time in ShipServ? You can also review all of your test results from your hospital stay or ER visit. Additional Information If you have questions, please visit the Frequently Asked Questions section of the ShipServ website at [email or web address]. Remember, ShipServ is NOT to be used for urgent needs. For medical emergencies, dial 911. Now available from your iPhone and Android! Please provide this summary of care documentation to your next provider. Your primary care clinician is listed as Denver Jenny. If you have any questions after today's visit, please call [phone].

## 2017-12-18 ENCOUNTER — OFFICE VISIT (OUTPATIENT)
Dept: GYNECOLOGY | Age: 57
End: 2017-12-18

## 2017-12-18 ENCOUNTER — HOSPITAL ENCOUNTER (OUTPATIENT)
Dept: LAB | Age: 57
Discharge: HOME | End: 2017-12-18
Payer: COMMERCIAL

## 2017-12-18 VITALS
DIASTOLIC BLOOD PRESSURE: 76 MMHG | BODY MASS INDEX: 25.25 KG/M2 | WEIGHT: 128.6 LBS | HEART RATE: 87 BPM | HEIGHT: 60 IN | SYSTOLIC BLOOD PRESSURE: 111 MMHG

## 2017-12-18 DIAGNOSIS — R87.629 ABNORMAL VAGINAL CYTOLOGY: ICD-10-CM

## 2017-12-18 DIAGNOSIS — Z86.001 HISTORY OF CARCINOMA IN SITU OF CERVIX: Primary | ICD-10-CM

## 2017-12-18 PROCEDURE — 88142 CYTOPATH C/V THIN LAYER: CPT | Performed by: OBSTETRICS & GYNECOLOGY

## 2017-12-20 NOTE — PROGRESS NOTES
27 [facility] [address]ias [address], [address]  26 [phone]  Dr. Krzysztof De La Cruz, III    Dr. Jose Amaro, Mag[facility]     Office Note  Patient ID:  Name: Pancho Moreno  MRM: [id number]  : 1960/57 y.o. Date: 2017    Diagnosis:     ICD-10-CM ICD-9-CM    1. History of carcinoma in situ of cervix Z86.008 V13.89 PAP, LIQUID BASED, MANUAL SCREEN   2. Abnormal vaginal cytology R89.6 795.10 PAP, LIQUID BASED, MANUAL SCREEN       Problem List:   Patient Active Problem List   Diagnosis Code    Abnormal cytology R89.6    Pelvic mass R19.00       SUBJECTIVE:    Pancho Moreno is a 61 y.o. female who presents for followupcare following TLH/BSO, 2015,     The patient's pathology revealed   FINAL PATHOLOGIC DIAGNOSIS  Uterus, bilateral fallopian tubes and ovaries, hysterectomy and bilateral salpingo-oophorectomy:  Uterus:  Cervix: Unremarkable. Endometrium: Atrophic changes. Myometrium: Cellular leiomyoma. Ovaries, bilateral:  Right ovary: Atrophic changes. Serous inclusion cyst.  Left ovary: Serous cystadenofibroma. Fallopian tubes, bilateral:  Right fallopian tube: Benign paratubal cyst.  Left fallopian tube: Benign paratubal cyst.    2017:  Cytology   LGSIL/HPV effect  12/15/2016:  Cytology   ASCUS  2016:  Cytology   Negative  Vaginal Biopsy:   Benign polyp, HPV effect    The patient presents today at a six month interval in benign followup. Currently she has no problems with eating, bowel movements, voiding  Appetite is good. Eating a regular diet without difficulty. Bowel movements are regular. The patient is not having any pain. .    Medications:     Current Outpatient Prescriptions on File Prior to Visit   Medication Sig Dispense Refill    naproxen (NAPROSYN) 500 mg tablet Take 500 mg by mouth two (2) times daily (with meals).  multivitamin (ONE A DAY) tablet Take 1 Tab by mouth daily.        No current facility-administered medications on file prior to visit. Allergies:   No Known Allergies  Past Medical History:   Diagnosis Date    Abnormal Pap smear of cervix 2014    ASCUS, HPV +, ? HGSIL, colpo bx negative, LEEP negative    Abnormal Pap smear of cervix 1/2015    HPV +, 16/18/45 negative    Ill-defined condition     recent bout of Bronchitis     Past Surgical History:   Procedure Laterality Date    BREAST SURGERY PROCEDURE UNLISTED      breast biopsy left 1995    HX BREAST BIOPSY  6/21/16    benign per pt    HX GYN      2 vaginal births    HX GYN      tubal ligation    HX GYN      hysterectomy 2015    HX LEEP PROCEDURE  6/26/14     Social History     Social History    Marital status:      Spouse name: N/A    Number of children: N/A    Years of education: N/A     Occupational History    Not on file. Social History Main Topics    Smoking status: Former Smoker    Smokeless tobacco: Never Used    Alcohol use No    Drug use: No    Sexual activity: Not on file     Other Topics Concern    Not on file     Social History Narrative       OBJECTIVE:    Vitals:   Vitals:    12/18/17 1535   BP: 111/76   Pulse: 87   Weight: 128 lb 9.6 oz (58.3 kg)   Height: 5' (1.524 m)     Physical Examination:     General:  no distress   Lungs: lungs clear to auscultation and auscultation   Cardiac: Regular rate and rhythm   Abdomen: soft, bowel sounds active, non-tender  No CVA tenderness   Incision: healing well   Pelvic: Vulva: No gross lesion. BUS negative  Vagina: Again no gross lesion, no suspicious induration or nodularity. Cytology taken without friability. Bimanual Exam: No suspicious masses, induration or nodularity. Rectal: not done   Extremity:   extremities normal, atraumatic, no cyanosis or edema       IMPRESSION:    Kenia Vegas is doing well. .  She has a working diagnosis of:    ICD-10-CM ICD-9-CM    1.  History of carcinoma in situ of cervix Z86.008 V13.89 PAP, LIQUID BASED, MANUAL SCREEN   2. Abnormal vaginal cytology R89.6 795.10 PAP, LIQUID BASED, MANUAL SCREEN    . Medical problems:     Patient Active Problem List   Diagnosis Code    Abnormal cytology R89.6    Pelvic mass R19.00       PLAN:    Cytology taken  Return six months pending cytology. Akira Burns MD  12/20/2017/10:13 AM    Defined Sensitive Document    CC:   Linden Conde MD   No ref.  provider found

## 2018-01-17 NOTE — PROGRESS NOTES
Patient notified of results, pt verbalized understanding of results and MD's recommendations, pt has scheduled a colpo for 1/29/18 at 11:15 am, tickled for follow up

## 2018-01-29 ENCOUNTER — OFFICE VISIT (OUTPATIENT)
Dept: GYNECOLOGY | Age: 58
End: 2018-01-29

## 2018-01-29 VITALS
HEART RATE: 103 BPM | WEIGHT: 130 LBS | SYSTOLIC BLOOD PRESSURE: 119 MMHG | HEIGHT: 60 IN | BODY MASS INDEX: 25.52 KG/M2 | DIASTOLIC BLOOD PRESSURE: 74 MMHG

## 2018-01-29 DIAGNOSIS — Z86.001 HISTORY OF CARCINOMA IN SITU OF CERVIX: Primary | ICD-10-CM

## 2018-01-29 DIAGNOSIS — R89.6 ABNORMAL CYTOLOGY: ICD-10-CM

## 2018-01-29 NOTE — PROGRESS NOTES
27 [facility] [address], [address]  [phone]  Dr. Charu Rosa, [address] [facility]     Office Note  Patient ID:  Name: Janice Burgess  MRM: [id number]  : 1960/57 y.o. Date: 2018    Diagnosis:     ICD-10-CM ICD-9-CM    1. History of carcinoma in situ of cervix Z86.008 V13.89    2. Abnormal cytology R89.6 792.9        Problem List:   Patient Active Problem List   Diagnosis Code    Abnormal cytology R89.6    Pelvic mass R19.00       SUBJECTIVE:    Janice Burgess is a 61 y.o. female who presents for followupcare following TLH/BSO, 2015,     The patient's pathology revealed   FINAL PATHOLOGIC DIAGNOSIS  Uterus, bilateral fallopian tubes and ovaries, hysterectomy and bilateral salpingo-oophorectomy:  Uterus:  Cervix: Unremarkable. Endometrium: Atrophic changes. Myometrium: Cellular leiomyoma. Ovaries, bilateral:  Right ovary: Atrophic changes. Serous inclusion cyst.  Left ovary: Serous cystadenofibroma. Fallopian tubes, bilateral:  Right fallopian tube: Benign paratubal cyst.  Left fallopian tube: Benign paratubal cyst.    2018:     Vaginal Thin Prep   Satisfactory for evaluation. LOW-GRADE SQUAMOUS INTRAEPITHELIAL LESION, MILD DYSPLASIA (VaIN I)/ HPV EFFECT.     2017:  Cytology   LGSIL/HPV effect  12/15/2016:  Cytology   ASCUS  2016:  Cytology   Negative  Vaginal Biopsy:   Benign polyp, HPV effect    2018: The patient presents today for colposcopic evaluation    Currently she has no problems with eating, bowel movements, voiding  Appetite is good. Eating a regular diet without difficulty. Bowel movements are regular. The patient is not having any pain. .    Medications:     Current Outpatient Prescriptions on File Prior to Visit   Medication Sig Dispense Refill    naproxen (NAPROSYN) 500 mg tablet Take 500 mg by mouth two (2) times daily (with meals).       multivitamin (ONE A DAY) tablet Take 1 Tab by mouth daily. No current facility-administered medications on file prior to visit. Allergies:   No Known Allergies  Past Medical History:   Diagnosis Date    Abnormal Pap smear of cervix 2014    ASCUS, HPV +, ? HGSIL, colpo bx negative, LEEP negative    Abnormal Pap smear of cervix 1/2015    HPV +, 16/18/45 negative    Ill-defined condition     recent bout of Bronchitis     Past Surgical History:   Procedure Laterality Date    BREAST SURGERY PROCEDURE UNLISTED      breast biopsy left 1995    HX BREAST BIOPSY  6/21/16    benign per pt    HX GYN      2 vaginal births    HX GYN      tubal ligation    HX GYN      hysterectomy 2015    HX LEEP PROCEDURE  6/26/14     Social History     Social History    Marital status:      Spouse name: N/A    Number of children: N/A    Years of education: N/A     Occupational History    Not on file. Social History Main Topics    Smoking status: Former Smoker    Smokeless tobacco: Never Used    Alcohol use No    Drug use: No    Sexual activity: Not on file     Other Topics Concern    Not on file     Social History Narrative       OBJECTIVE:    Vitals:   Vitals:    01/29/18 1401   BP: 119/74   Pulse: (!) 103   Weight: 130 lb (59 kg)   Height: 5' (1.524 m)     Physical Examination:     General:  no distress   Lungs: lungs clear to auscultation and auscultation   Cardiac: Regular rate and rhythm   Abdomen: soft, bowel sounds active, non-tender  No CVA tenderness   Incision: healing well   Pelvic: Vulva: No gross lesion. BUS negative  Vagina: Again no gross lesion, no suspicious induration or nodularity. Cytology taken without friability. Colposcopy erformed with 4% Ld1CUQP. Adequate but difficult. Single island of AW epithelim noted at theleft vagina angle, biopsies. No additional lesions. Bimanual Exam: No suspicious masses, induration or nodularity.    Rectal: not done   Extremity:   extremities normal, atraumatic, no cyanosis or edema     Informed consent obtained  [facility] GYNECOLOGIC ONCOLOGY  OFFICE PROCEDURE PROGRESS NOTE        Chart reviewed for the following:   Bell Pizarro MD, have reviewed the History, Physical and updated the Allergic reactions for Randal Scherer performed immediately prior to start of procedure:   Bell Pizarro MD, have performed the following reviews on Merlin Stalls prior to the start of the procedure:            * Patient was identified by name and date of birth   * Agreement on procedure being performed was verified  * Risks and Benefits explained to the patient  * Procedure site verified and marked as necessary  * Patient was positioned for comfort  * Consent was signed and verified     Time: 11:46 AM    Date of procedure: 1/30/2018    Procedure performed by:  Tamela Denis MD    Provider assisted by: Ananth Ponce LPN    Patient assisted by: self    How tolerated by patient: tolerated the procedure well with no complications    Post Procedural Pain Scale: 0 - No Hurt    Comments: none    IMPRESSION:    Merlin Stalls is doing well. .  She has a working diagnosis of:    ICD-10-CM ICD-9-CM    1. History of carcinoma in situ of cervix Z86.008 V13.89    2. Abnormal cytology R89.6 792.9     . Medical problems:     Patient Active Problem List   Diagnosis Code    Abnormal cytology R89.6    Pelvic mass R19.00       PLAN:    Cytology taken. Biopsy taken  Will Call  Return six months pending cytology. Tamela Denis MD  1/29/2018/10:13 AM    Defined Sensitive Document    CC:   Car Damian MD   No ref.  provider found

## 2018-01-30 ENCOUNTER — HOSPITAL ENCOUNTER (OUTPATIENT)
Dept: LAB | Age: 58
Discharge: HOME | End: 2018-01-30

## 2018-02-27 ENCOUNTER — TELEPHONE (OUTPATIENT)
Dept: GYNECOLOGY | Age: 58
End: 2018-02-27

## 2018-02-27 NOTE — TELEPHONE ENCOUNTER
S/w pt, per Dr Carlos Clark, repeat pap in 3 months from [address] [place] [address], pt verbalized understanding and made an appt for 4/24/18

## 2018-03-06 ENCOUNTER — ANESTHESIA EVENT (OUTPATIENT)
Dept: MEDSURG UNIT | Age: 58
End: 2018-03-06
Payer: COMMERCIAL

## 2018-03-06 ENCOUNTER — HOSPITAL ENCOUNTER (OUTPATIENT)
Dept: PREADMISSION TESTING | Age: 58
Discharge: HOME | End: 2018-03-06
Payer: COMMERCIAL

## 2018-03-06 VITALS
HEART RATE: 78 BPM | SYSTOLIC BLOOD PRESSURE: 107 MMHG | BODY MASS INDEX: 25.52 KG/M2 | TEMPERATURE: 98.2 F | DIASTOLIC BLOOD PRESSURE: 71 MMHG | WEIGHT: 130 LBS | OXYGEN SATURATION: 96 % | HEIGHT: 60 IN

## 2018-03-06 LAB
ANION GAP SERPL CALC-SCNC: 6 MMOL/L (ref 5–15)
ATRIAL RATE: 73 BPM
BASOPHILS # BLD: 0 K/UL (ref 0–0.1)
BASOPHILS NFR BLD: 0 % (ref 0–1)
BUN SERPL-MCNC: 11 MG/DL (ref 6–20)
BUN/CREAT SERPL: 11 (ref 12–20)
CALCIUM SERPL-MCNC: 9.3 MG/DL (ref 8.5–10.1)
CALCULATED P AXIS, ECG09: 75 DEGREES
CALCULATED R AXIS, ECG10: 1 DEGREES
CALCULATED T AXIS, ECG11: 51 DEGREES
CHLORIDE SERPL-SCNC: 106 MMOL/L (ref 97–108)
CO2 SERPL-SCNC: 29 MMOL/L (ref 21–32)
CREAT SERPL-MCNC: 0.98 MG/DL (ref 0.55–1.02)
DIAGNOSIS, 93000: NORMAL
DIFFERENTIAL METHOD BLD: NORMAL
EOSINOPHIL # BLD: 0.1 K/UL (ref 0–0.4)
EOSINOPHIL NFR BLD: 2 % (ref 0–7)
ERYTHROCYTE [DISTWIDTH] IN BLOOD BY AUTOMATED COUNT: 13 % (ref 11.5–14.5)
GLUCOSE SERPL-MCNC: 90 MG/DL (ref 65–100)
HCT VFR BLD AUTO: 39.9 % (ref 35–47)
HGB BLD-MCNC: 13.1 G/DL (ref 11.5–16)
IMM GRANULOCYTES # BLD: 0 K/UL (ref 0–0.04)
IMM GRANULOCYTES NFR BLD AUTO: 0 % (ref 0–0.5)
LYMPHOCYTES # BLD: 1.3 K/UL (ref 0.8–3.5)
LYMPHOCYTES NFR BLD: 26 % (ref 12–49)
MCH RBC QN AUTO: 29.3 PG (ref 26–34)
MCHC RBC AUTO-ENTMCNC: 32.8 G/DL (ref 30–36.5)
MCV RBC AUTO: 89.3 FL (ref 80–99)
MONOCYTES # BLD: 0.5 K/UL (ref 0–1)
MONOCYTES NFR BLD: 10 % (ref 5–13)
NEUTS SEG # BLD: 3 K/UL (ref 1.8–8)
NEUTS SEG NFR BLD: 62 % (ref 32–75)
NRBC # BLD: 0 K/UL (ref 0–0.01)
NRBC BLD-RTO: 0 PER 100 WBC
P-R INTERVAL, ECG05: 158 MS
PLATELET # BLD AUTO: 233 K/UL (ref 150–400)
PMV BLD AUTO: 10.2 FL (ref 8.9–12.9)
POTASSIUM SERPL-SCNC: 4.5 MMOL/L (ref 3.5–5.1)
Q-T INTERVAL, ECG07: 400 MS
QRS DURATION, ECG06: 74 MS
QTC CALCULATION (BEZET), ECG08: 440 MS
RBC # BLD AUTO: 4.47 M/UL (ref 3.8–5.2)
SODIUM SERPL-SCNC: 141 MMOL/L (ref 136–145)
VENTRICULAR RATE, ECG03: 73 BPM
WBC # BLD AUTO: 4.8 K/UL (ref 3.6–11)

## 2018-03-06 PROCEDURE — 80048 BASIC METABOLIC PNL TOTAL CA: CPT | Performed by: ORTHOPAEDIC SURGERY

## 2018-03-06 PROCEDURE — 93005 ELECTROCARDIOGRAM TRACING: CPT

## 2018-03-06 PROCEDURE — 85025 COMPLETE CBC W/AUTO DIFF WBC: CPT | Performed by: ORTHOPAEDIC SURGERY

## 2018-03-06 RX ORDER — FENTANYL CITRATE 50 UG/ML
25 INJECTION, SOLUTION INTRAMUSCULAR; INTRAVENOUS
Status: CANCELLED | OUTPATIENT
Start: 2018-03-06

## 2018-03-06 RX ORDER — SODIUM CHLORIDE 0.9 % (FLUSH) 0.9 %
5-10 SYRINGE (ML) INJECTION AS NEEDED
Status: CANCELLED | OUTPATIENT
Start: 2018-03-06

## 2018-03-06 RX ORDER — ONDANSETRON 2 MG/ML
4 INJECTION INTRAMUSCULAR; INTRAVENOUS AS NEEDED
Status: CANCELLED | OUTPATIENT
Start: 2018-03-06

## 2018-03-06 RX ORDER — MORPHINE SULFATE 10 MG/ML
2 INJECTION, SOLUTION INTRAMUSCULAR; INTRAVENOUS
Status: CANCELLED | OUTPATIENT
Start: 2018-03-06

## 2018-03-06 RX ORDER — HYDROMORPHONE HYDROCHLORIDE 1 MG/ML
0.2 INJECTION, SOLUTION INTRAMUSCULAR; INTRAVENOUS; SUBCUTANEOUS
Status: CANCELLED | OUTPATIENT
Start: 2018-03-06

## 2018-03-07 ENCOUNTER — ANESTHESIA (OUTPATIENT)
Dept: MEDSURG UNIT | Age: 58
End: 2018-03-07
Payer: COMMERCIAL

## 2018-03-07 ENCOUNTER — HOSPITAL ENCOUNTER (OUTPATIENT)
Age: 58
Setting detail: OUTPATIENT SURGERY
Discharge: HOME | End: 2018-03-07
Attending: ORTHOPAEDIC SURGERY | Admitting: ORTHOPAEDIC SURGERY
Payer: COMMERCIAL

## 2018-03-07 VITALS
HEART RATE: 80 BPM | HEIGHT: 60 IN | SYSTOLIC BLOOD PRESSURE: 114 MMHG | RESPIRATION RATE: 16 BRPM | BODY MASS INDEX: 25.52 KG/M2 | OXYGEN SATURATION: 100 % | WEIGHT: 130 LBS | TEMPERATURE: 97.7 F | DIASTOLIC BLOOD PRESSURE: 64 MMHG

## 2018-03-07 PROCEDURE — 74011250636 HC RX REV CODE- 250/636: Performed by: ORTHOPAEDIC SURGERY

## 2018-03-07 PROCEDURE — 77030006689 HC BLD OPHTH BVR BD -A: Performed by: ORTHOPAEDIC SURGERY

## 2018-03-07 PROCEDURE — 76060000061 HC AMB SURG ANES 0.5 TO 1 HR: Performed by: ORTHOPAEDIC SURGERY

## 2018-03-07 PROCEDURE — 77030010509 HC AIRWY LMA MSK TELE -A: Performed by: ANESTHESIOLOGY

## 2018-03-07 PROCEDURE — 77030019908 HC STETH ESOPH SIMS -A: Performed by: ANESTHESIOLOGY

## 2018-03-07 PROCEDURE — 74011250636 HC RX REV CODE- 250/636: Performed by: ANESTHESIOLOGY

## 2018-03-07 PROCEDURE — 76210000035 HC AMBSU PH I REC 1 TO 1.5 HR: Performed by: ORTHOPAEDIC SURGERY

## 2018-03-07 PROCEDURE — 77030006602 HC BLD CRPL AGEE MCRA -C: Performed by: ORTHOPAEDIC SURGERY

## 2018-03-07 PROCEDURE — 77030010507 HC ADH SKN DERMBND J&J -B: Performed by: ORTHOPAEDIC SURGERY

## 2018-03-07 PROCEDURE — 74011250636 HC RX REV CODE- 250/636

## 2018-03-07 PROCEDURE — 74011250637 HC RX REV CODE- 250/637

## 2018-03-07 PROCEDURE — 77030020782 HC GWN BAIR PAWS FLX 3M -B

## 2018-03-07 PROCEDURE — 74011000250 HC RX REV CODE- 250

## 2018-03-07 PROCEDURE — 77030020754 HC CUF TRNQT 2BLA STRY -B: Performed by: ORTHOPAEDIC SURGERY

## 2018-03-07 PROCEDURE — 76030000000 HC AMB SURG OR TIME 0.5 TO 1: Performed by: ORTHOPAEDIC SURGERY

## 2018-03-07 PROCEDURE — 74011000250 HC RX REV CODE- 250: Performed by: ORTHOPAEDIC SURGERY

## 2018-03-07 PROCEDURE — 77030018836 HC SOL IRR NACL ICUM -A: Performed by: ORTHOPAEDIC SURGERY

## 2018-03-07 PROCEDURE — 77030002933 HC SUT MCRYL J&J -A: Performed by: ORTHOPAEDIC SURGERY

## 2018-03-07 RX ORDER — SODIUM CHLORIDE, SODIUM LACTATE, POTASSIUM CHLORIDE, CALCIUM CHLORIDE 600; 310; 30; 20 MG/100ML; MG/100ML; MG/100ML; MG/100ML
INJECTION, SOLUTION INTRAVENOUS
Status: DISCONTINUED | OUTPATIENT
Start: 2018-03-07 | End: 2018-03-07 | Stop reason: HOSPADM

## 2018-03-07 RX ORDER — PROPOFOL 10 MG/ML
INJECTION, EMULSION INTRAVENOUS AS NEEDED
Status: DISCONTINUED | OUTPATIENT
Start: 2018-03-07 | End: 2018-03-07 | Stop reason: HOSPADM

## 2018-03-07 RX ORDER — NALOXONE HYDROCHLORIDE 0.4 MG/ML
INJECTION, SOLUTION INTRAMUSCULAR; INTRAVENOUS; SUBCUTANEOUS AS NEEDED
Status: DISCONTINUED | OUTPATIENT
Start: 2018-03-07 | End: 2018-03-07 | Stop reason: HOSPADM

## 2018-03-07 RX ORDER — FENTANYL CITRATE 50 UG/ML
INJECTION, SOLUTION INTRAMUSCULAR; INTRAVENOUS AS NEEDED
Status: DISCONTINUED | OUTPATIENT
Start: 2018-03-07 | End: 2018-03-07 | Stop reason: HOSPADM

## 2018-03-07 RX ORDER — CEFAZOLIN SODIUM/WATER 2 G/20 ML
2 SYRINGE (ML) INTRAVENOUS ONCE
Status: COMPLETED | OUTPATIENT
Start: 2018-03-07 | End: 2018-03-07

## 2018-03-07 RX ORDER — SODIUM CHLORIDE, SODIUM LACTATE, POTASSIUM CHLORIDE, CALCIUM CHLORIDE 600; 310; 30; 20 MG/100ML; MG/100ML; MG/100ML; MG/100ML
50 INJECTION, SOLUTION INTRAVENOUS CONTINUOUS
Status: DISCONTINUED | OUTPATIENT
Start: 2018-03-07 | End: 2018-03-07 | Stop reason: HOSPADM

## 2018-03-07 RX ORDER — MIDAZOLAM HYDROCHLORIDE 1 MG/ML
INJECTION, SOLUTION INTRAMUSCULAR; INTRAVENOUS AS NEEDED
Status: DISCONTINUED | OUTPATIENT
Start: 2018-03-07 | End: 2018-03-07 | Stop reason: HOSPADM

## 2018-03-07 RX ORDER — LIDOCAINE HYDROCHLORIDE 20 MG/ML
INJECTION, SOLUTION EPIDURAL; INFILTRATION; INTRACAUDAL; PERINEURAL AS NEEDED
Status: DISCONTINUED | OUTPATIENT
Start: 2018-03-07 | End: 2018-03-07 | Stop reason: HOSPADM

## 2018-03-07 RX ORDER — SODIUM CHLORIDE 0.9 % (FLUSH) 0.9 %
5-10 SYRINGE (ML) INJECTION AS NEEDED
Status: DISCONTINUED | OUTPATIENT
Start: 2018-03-07 | End: 2018-03-07 | Stop reason: HOSPADM

## 2018-03-07 RX ORDER — SCOLOPAMINE TRANSDERMAL SYSTEM 1 MG/1
PATCH, EXTENDED RELEASE TRANSDERMAL AS NEEDED
Status: DISCONTINUED | OUTPATIENT
Start: 2018-03-07 | End: 2018-03-07 | Stop reason: HOSPADM

## 2018-03-07 RX ORDER — BUPIVACAINE HYDROCHLORIDE 5 MG/ML
30 INJECTION, SOLUTION EPIDURAL; INTRACAUDAL ONCE
Status: COMPLETED | OUTPATIENT
Start: 2018-03-07 | End: 2018-03-07

## 2018-03-07 RX ORDER — DEXAMETHASONE SODIUM PHOSPHATE 4 MG/ML
INJECTION, SOLUTION INTRA-ARTICULAR; INTRALESIONAL; INTRAMUSCULAR; INTRAVENOUS; SOFT TISSUE AS NEEDED
Status: DISCONTINUED | OUTPATIENT
Start: 2018-03-07 | End: 2018-03-07 | Stop reason: HOSPADM

## 2018-03-07 RX ORDER — SODIUM CHLORIDE 0.9 % (FLUSH) 0.9 %
5-10 SYRINGE (ML) INJECTION EVERY 8 HOURS
Status: DISCONTINUED | OUTPATIENT
Start: 2018-03-07 | End: 2018-03-07 | Stop reason: HOSPADM

## 2018-03-07 RX ORDER — LIDOCAINE HYDROCHLORIDE 10 MG/ML
0.1 INJECTION, SOLUTION EPIDURAL; INFILTRATION; INTRACAUDAL; PERINEURAL AS NEEDED
Status: DISCONTINUED | OUTPATIENT
Start: 2018-03-07 | End: 2018-03-07 | Stop reason: HOSPADM

## 2018-03-07 RX ORDER — ONDANSETRON 2 MG/ML
INJECTION INTRAMUSCULAR; INTRAVENOUS AS NEEDED
Status: DISCONTINUED | OUTPATIENT
Start: 2018-03-07 | End: 2018-03-07 | Stop reason: HOSPADM

## 2018-03-07 RX ADMIN — LIDOCAINE HYDROCHLORIDE 80 MG: 20 INJECTION, SOLUTION EPIDURAL; INFILTRATION; INTRACAUDAL; PERINEURAL at 13:42

## 2018-03-07 RX ADMIN — ONDANSETRON 4 MG: 2 INJECTION INTRAMUSCULAR; INTRAVENOUS at 13:45

## 2018-03-07 RX ADMIN — DEXAMETHASONE SODIUM PHOSPHATE 4 MG: 4 INJECTION, SOLUTION INTRA-ARTICULAR; INTRALESIONAL; INTRAMUSCULAR; INTRAVENOUS; SOFT TISSUE at 13:45

## 2018-03-07 RX ADMIN — MIDAZOLAM HYDROCHLORIDE 2 MG: 1 INJECTION, SOLUTION INTRAMUSCULAR; INTRAVENOUS at 13:37

## 2018-03-07 RX ADMIN — SODIUM CHLORIDE, SODIUM LACTATE, POTASSIUM CHLORIDE, AND CALCIUM CHLORIDE 50 ML/HR: 600; 310; 30; 20 INJECTION, SOLUTION INTRAVENOUS at 12:15

## 2018-03-07 RX ADMIN — PROPOFOL 25 MG: 10 INJECTION, EMULSION INTRAVENOUS at 13:54

## 2018-03-07 RX ADMIN — FENTANYL CITRATE 25 MCG: 50 INJECTION, SOLUTION INTRAMUSCULAR; INTRAVENOUS at 13:57

## 2018-03-07 RX ADMIN — Medication 2 G: at 13:45

## 2018-03-07 RX ADMIN — NALOXONE HYDROCHLORIDE 0.05 MG: 0.4 INJECTION, SOLUTION INTRAMUSCULAR; INTRAVENOUS; SUBCUTANEOUS at 14:17

## 2018-03-07 RX ADMIN — FENTANYL CITRATE 25 MCG: 50 INJECTION, SOLUTION INTRAMUSCULAR; INTRAVENOUS at 13:54

## 2018-03-07 RX ADMIN — FENTANYL CITRATE 25 MCG: 50 INJECTION, SOLUTION INTRAMUSCULAR; INTRAVENOUS at 13:51

## 2018-03-07 RX ADMIN — NALOXONE HYDROCHLORIDE 0.05 MG: 0.4 INJECTION, SOLUTION INTRAMUSCULAR; INTRAVENOUS; SUBCUTANEOUS at 14:27

## 2018-03-07 RX ADMIN — SCOLOPAMINE TRANSDERMAL SYSTEM 1 PATCH: 1 PATCH, EXTENDED RELEASE TRANSDERMAL at 13:35

## 2018-03-07 RX ADMIN — PROPOFOL 150 MG: 10 INJECTION, EMULSION INTRAVENOUS at 13:42

## 2018-03-07 RX ADMIN — SODIUM CHLORIDE, SODIUM LACTATE, POTASSIUM CHLORIDE, CALCIUM CHLORIDE: 600; 310; 30; 20 INJECTION, SOLUTION INTRAVENOUS at 13:35

## 2018-03-07 NOTE — PERIOP NOTES
Patient: Mani Foster MRN: [id number]  SSN: [id number]   YOB: 1960  Age: 61 y.o. Sex: female     Patient is status post Procedure(s):  RIGHT ENDOSCOPIC CARPAL TUNNEL RELEASE . Surgeon(s) and Role:     * Yvette Álvarez MD - Primary    Local/Dose/Irrigation:  SEE MAR                  Peripheral IV 03/07/18 Left Hand (Active)   Site Assessment Clean, dry, & intact 3/7/2018 12:16 PM   Phlebitis Assessment 0 3/7/2018 12:16 PM   Infiltration Assessment 0 3/7/2018 12:16 PM   Dressing Status Clean, dry, & intact 3/7/2018 12:16 PM   Dressing Type Transparent 3/7/2018 12:16 PM   Hub Color/Line Status Blue 3/7/2018 12:16 PM            Airway - Endotracheal Tube 03/07/18 Oral (Active)                   Dressing/Packing:  Wound Hand Right-DRESSING TYPE: 4 x 4;Topical skin adhesive/glue; Other (Comment) (dermabond, coban) (03/07/18 1300)  Splint/Cast:  ]    Other:

## 2018-03-07 NOTE — ANESTHESIA PREPROCEDURE EVALUATION
Anesthetic History     PONV          Review of Systems / Medical History  Patient summary reviewed, nursing notes reviewed and pertinent labs reviewed    Pulmonary  Within defined limits                 Neuro/Psych   Within defined limits           Cardiovascular                  Exercise tolerance: >4 METS     GI/Hepatic/Renal  Within defined limits              Endo/Other  Within defined limits           Other Findings              Physical Exam    Airway  Mallampati: I  TM Distance: > 6 cm  Neck ROM: normal range of motion        Cardiovascular    Rhythm: regular  Rate: normal         Dental  No notable dental hx       Pulmonary  Breath sounds clear to auscultation               Abdominal         Other Findings            Anesthetic Plan    ASA: 1  Anesthesia type: general          Induction: Intravenous  Anesthetic plan and risks discussed with: Patient

## 2018-03-07 NOTE — OP NOTES
500 [facility] OP NOTE    Noé Knight  MR#: [id number]  : 1960  ACCOUNT #: [de-identified]   DATE OF SERVICE: 2018    PREOPERATIVE DIAGNOSIS:  Right carpal tunnel syndrome. POSTOPERATIVE DIAGNOSIS:  Right carpal tunnel syndrome. PROCEDURE PERFORMED:  Right endoscopic carpal tunnel release. SURGEON:  Ratna Nuñez MD    ASSISTANT:  0    ANESTHESIA:  General.    COMPLICATIONS:  None. ESTIMATED BLOOD LOSS:  Minimal.    SPECIMENS:  None. IMPLANTS:  0    INDICATIONS FOR THE PROCEDURE:  This is a 58-year-old female with signs and symptoms of carpal tunnel compression on the right. She presents now for the above procedure. PROCEDURE:  After informed consent was obtained and after the surgical site was marked, the patient was taken to the operating room and placed in the supine position. General anesthesia was induced. A pneumatic tourniquet was applied to the right upper arm and the arm was prepped and draped in the usual sterile fashion. An Esmarch bandage was used to exsanguinate the limb and the tourniquet was inflated to 250 mmHg. A transverse incision was made in the distal forearm just ulnar to the palmaris longus tendon. The incision was carried through skin and subcutaneous tissue was spread bluntly. Bleeding was controlled with bipolar electrocautery. A distally based flap of antebrachial fascia was raised and the median nerve was identified. The carpal canal was then entered with the tenosynovial elevator followed by the hamate finder. Next, the endoscope was introduced into the carpal canal and the undersurface of the transverse carpal ligament, distal fat pad and median nerve were all identified using the scope. With satisfactory visualization, the transverse carpal ligament was divided from distal to proximal.  Complete release was achieved. The scope was withdrawn and the fascial flap was excised.   Additional forearm fasciotomy was undertaken under direct vision, using blunt tip scissors. The skin was closed with subcuticular 5-0 Monocryl followed by Dermabond. Marcaine was injected for postop pain relief. A soft sterile dressing was applied. The tourniquet was released after 10 minutes. The patient tolerated the procedure well and was taken to the recovery room in satisfactory condition.       MD SHAAN Milner / Jolly Greene  D: 03/07/2018 14:17     T: 03/07/2018 14:41  JOB #: [id number]

## 2018-03-07 NOTE — BRIEF OP NOTE
BRIEF OPERATIVE NOTE    Date of Procedure: 3/7/2018   Preoperative Diagnosis: RIGHT CARPAL TUNNEL SYNDROME  Postoperative Diagnosis: RIGHT CARPAL TUNNEL SYNDROME    Procedure(s):  RIGHT ENDOSCOPIC CARPAL TUNNEL RELEASE   Surgeon(s) and Role:     * Sejal Carreno MD - Primary         Assistant Staff: None      Surgical Staff:  Circ-1: Perry Hamilton RN  Scrub RN-1: Olvin Harris RN  Event Time In   Incision Start 1354   Incision Close 1403     Anesthesia: General   Estimated Blood Loss: min  Specimens: * No specimens in log *   Findings: see op note   Complications: none  Implants: * No implants in log *

## 2018-03-07 NOTE — ANESTHESIA POSTPROCEDURE EVALUATION
Post-Anesthesia Evaluation and Assessment    Patient: Gama Burdick MRN: [id number]  SSN: [id number]    YOB: 1960  Age: 61 y.o. Sex: female       Cardiovascular Function/Vital Signs  Visit Vitals    /65    Pulse 81    Temp 36.5 °C (97.7 °F)    Resp 10    Ht 5' (1.524 m)    Wt 59 kg (130 lb)    SpO2 100%    BMI 25.39 kg/m2       Patient is status post general anesthesia for Procedure(s):  RIGHT ENDOSCOPIC CARPAL TUNNEL RELEASE . Nausea/Vomiting: None    Postoperative hydration reviewed and adequate. Pain:  Pain Scale 1: FLACC (03/07/18 1411)  Pain Intensity 1: 0 (03/07/18 1411)   Managed    Neurological Status:   Neuro (WDL): Within Defined Limits (03/07/18 1411)   At baseline    Mental Status and Level of Consciousness: Arousable    Pulmonary Status:   O2 Device: Nasal cannula (03/07/18 1411)   Adequate oxygenation and airway patent    Complications related to anesthesia: None    Post-anesthesia assessment completed.  No concerns    Signed By: Weston Hatch MD     March 7, 2018

## 2018-03-07 NOTE — ANESTHESIA POSTPROCEDURE EVALUATION
Post-Anesthesia Evaluation and Assessment    Patient: [facility] MRN: [id number]  SSN: [id number]    YOB: 1960  Age: 61 y.o. Sex: female       Cardiovascular Function/Vital Signs  Visit Vitals    /65    Pulse 81    Temp 36.5 °C (97.7 °F)    Resp 10    Ht 5' (1.524 m)    Wt 59 kg (130 lb)    SpO2 100%    BMI 25.39 kg/m2       Patient is status post general anesthesia for Procedure(s):  RIGHT ENDOSCOPIC CARPAL TUNNEL RELEASE . Nausea/Vomiting: None    Postoperative hydration reviewed and adequate. Pain:  Pain Scale 1: FLACC (03/07/18 1411)  Pain Intensity 1: 0 (03/07/18 1411)   Managed    Neurological Status:   Neuro (WDL): Within Defined Limits (03/07/18 1411)   At baseline    Mental Status and Level of Consciousness: Arousable    Pulmonary Status:   O2 Device: Nasal cannula (03/07/18 1411)   Adequate oxygenation and airway patent    Complications related to anesthesia: None    Post-anesthesia assessment completed.  No concerns    Signed By: Jules Dewitt MD     March 7, 2018

## 2018-04-24 ENCOUNTER — HOSPITAL ENCOUNTER (OUTPATIENT)
Dept: LAB | Age: 58
Discharge: HOME | End: 2018-04-24
Payer: COMMERCIAL

## 2018-04-24 ENCOUNTER — OFFICE VISIT (OUTPATIENT)
Dept: GYNECOLOGY | Age: 58
End: 2018-04-24

## 2018-04-24 VITALS
WEIGHT: 129.6 LBS | BODY MASS INDEX: 25.45 KG/M2 | HEART RATE: 90 BPM | HEIGHT: 60 IN | DIASTOLIC BLOOD PRESSURE: 79 MMHG | SYSTOLIC BLOOD PRESSURE: 115 MMHG

## 2018-04-24 DIAGNOSIS — N89.0 VAIN I (VAGINAL INTRAEPITHELIAL NEOPLASIA GRADE I): ICD-10-CM

## 2018-04-24 DIAGNOSIS — Z86.001 HISTORY OF CARCINOMA IN SITU OF CERVIX: Primary | ICD-10-CM

## 2018-04-24 DIAGNOSIS — R89.6 ABNORMAL CYTOLOGY: ICD-10-CM

## 2018-04-24 PROCEDURE — 88142 CYTOPATH C/V THIN LAYER: CPT | Performed by: OBSTETRICS & GYNECOLOGY

## 2018-04-24 NOTE — PROGRESS NOTES
27 [facility] [address], [address]  [phone]  Dr. Bryn Nunes, III    Dr. Claudeen Lute, Lurline Goldmann, [place] [facility]     Office Note  Patient ID:  Name: Teddy Eng  MRM: [id number]  : 1960/57 y.o. Date: 2018    Diagnosis:     ICD-10-CM ICD-9-CM    1. History of carcinoma in situ of cervix Z86.001 V13.89 PAP, LIQUID BASED, MANUAL SCREEN   2. Abnormal cytology R89.6 792.9 PAP, LIQUID BASED, MANUAL SCREEN   3. 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 female who presents for followupcare following TLH/BSO, 2015,     The patient's pathology revealed   FINAL PATHOLOGIC DIAGNOSIS  Uterus, bilateral fallopian tubes and ovaries, hysterectomy and bilateral salpingo-oophorectomy:  Uterus:  Cervix: Unremarkable. Endometrium: Atrophic changes. Myometrium: Cellular leiomyoma. Ovaries, bilateral:  Right ovary: Atrophic changes. Serous inclusion cyst.  Left ovary: Serous cystadenofibroma. Fallopian tubes, bilateral:  Right fallopian tube: Benign paratubal cyst.  Left fallopian tube: Benign paratubal cyst.      2018:  Vaginal Biopsy     FINAL PATHOLOGIC DIAGNOSIS   Left vaginal apex, biopsy:   Low-grade squamous intraepithelial neoplasia (VaIN I)     2018:   Vaginal Thin Prep   Satisfactory for evaluation. LOW-GRADE SQUAMOUS INTRAEPITHELIAL LESION, MILD DYSPLASIA (VaIN I)/ HPV EFFECT.     2017:  Cytology   LGSIL/HPV effect  12/15/2016:  Cytology   ASCUS  2016:  Cytology   Negative  Vaginal Biopsy:   Benign polyp, HPV effect    2018: The patient presents today for colposcopic evaluation    Currently she has no problems with eating, bowel movements, voiding  Appetite is good. Eating a regular diet without difficulty.    Bowel movements are regular. The patient is not having any pain. .    Medications:     Current Outpatient Prescriptions on File Prior to Visit   Medication Sig Dispense Refill    multivitamin (ONE A DAY) tablet Take 1 Tab by mouth daily.  naproxen (NAPROSYN) 500 mg tablet Take 500 mg by mouth as needed. No current facility-administered medications on file prior to visit. Allergies:   No Known Allergies  Past Medical History:   Diagnosis Date    Abnormal Pap smear of cervix 2014    ASCUS, HPV +, ? HGSIL, colpo bx negative, LEEP negative    Abnormal Pap smear of cervix 1/2015    HPV +, 16/18/45 negative    Adverse effect of anesthesia 03/06/2018    NAUSEA ONLY; \"LAST TIME THEY GAVE ME THE PATCH; I LIKE THE PATCH\"    Ill-defined condition 2015    recent bout of Bronchitis     Past Surgical History:   Procedure Laterality Date    BREAST SURGERY PROCEDURE UNLISTED      breast biopsy left 1995    HX BREAST BIOPSY Left 6/21/16    benign per pt    HX CARPAL TUNNEL RELEASE  2018    HX GYN      2 vaginal births    HX GYN      tubal ligation    HX GYN      hysterectomy 2015    HX LEEP PROCEDURE  6/26/14     Social History     Social History    Marital status:      Spouse name: N/A    Number of children: N/A    Years of education: N/A     Occupational History    Not on file.      Social History Main Topics    Smoking status: Former Smoker     Packs/day: 0.50     Years: 5.00     Quit date: 3/6/1986    Smokeless tobacco: Never Used    Alcohol use No    Drug use: No    Sexual activity: Not on file     Other Topics Concern    Not on file     Social History Narrative       OBJECTIVE:    Vitals:   Vitals:    04/24/18 1037   BP: 115/79   Pulse: 90   Weight: 129 lb 9.6 oz (58.8 kg)   Height: 5' (1.524 m)     Physical Examination:     General:  no distress   Lungs: lungs clear to auscultation and auscultation   Cardiac: Regular rate and rhythm   Abdomen: soft, bowel sounds active, non-tender  No CVA tenderness   Incision: healing well   Pelvic: Vulva: No gross lesion. BUS negative  Vagina: Again no gross lesion, no suspicious induration or nodularity. Cytology taken without friability. Bimanual Exam: No suspicious masses, induration or nodularity. Rectal: not done   Extremity:   extremities normal, atraumatic, no cyanosis or edema     IMPRESSION:    Aruna Mcnair is doing well. .  She has a working diagnosis of:    ICD-10-CM ICD-9-CM    1. History of carcinoma in situ of cervix Z86.001 V13.89 PAP, LIQUID BASED, MANUAL SCREEN   2. Abnormal cytology R89.6 792.9 PAP, LIQUID BASED, MANUAL SCREEN   3. 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 Medical problems:     Patient Active Problem List   Diagnosis Code    Abnormal cytology R89.6    Pelvic mass R19.00       PLAN:    Cytology taken. Return six months pending cytology. Rick Benton MD  4/24/2018/10:13 AM    Defined Sensitive Document    CC:   Roberto Rose MD   No ref.  provider found

## 2018-04-24 NOTE — PROGRESS NOTES
3 month follow up from colpo, repeat pap smear, pt reports no abnormal spotting or bleeding, pt states she has no questions or concerns for today's visit

## 2018-05-03 NOTE — PROGRESS NOTES
Patient notified of results, pt verbalized understanding of results and MD's recommendations, pt scheduled colpo on 5/17/18, tickled for follow up

## 2018-05-17 ENCOUNTER — HOSPITAL ENCOUNTER (OUTPATIENT)
Dept: LAB | Age: 58
Discharge: HOME | End: 2018-05-17

## 2018-05-17 ENCOUNTER — OFFICE VISIT (OUTPATIENT)
Dept: GYNECOLOGY | Age: 58
End: 2018-05-17

## 2018-05-17 VITALS
HEART RATE: 72 BPM | SYSTOLIC BLOOD PRESSURE: 105 MMHG | HEIGHT: 60 IN | WEIGHT: 129 LBS | BODY MASS INDEX: 25.32 KG/M2 | DIASTOLIC BLOOD PRESSURE: 68 MMHG

## 2018-05-17 DIAGNOSIS — R89.6 ABNORMAL CYTOLOGY: Primary | ICD-10-CM

## 2018-05-17 DIAGNOSIS — Z86.001 HISTORY OF CARCINOMA IN SITU OF CERVIX: ICD-10-CM

## 2018-05-17 PROCEDURE — 88305 TISSUE EXAM BY PATHOLOGIST: CPT | Performed by: OBSTETRICS & GYNECOLOGY

## 2018-05-17 PROCEDURE — 88342 IMHCHEM/IMCYTCHM 1ST ANTB: CPT | Performed by: OBSTETRICS & GYNECOLOGY

## 2018-05-17 NOTE — PROGRESS NOTES
27 [facility] [address]ias Mor[address], [address]  [phone]  Dr. Scott Salguero, III    Dr. Leonardo Jasso [facility]     Office Note  Patient ID:  Name: Eun Horn  MRM: [id number]  : 1960/57 y.o. Date: 2018    Diagnosis:     ICD-10-CM ICD-9-CM    1. Abnormal cytology R89.6 792.9    2. History of carcinoma in situ of cervix Z86.001 V13.89        Problem List:   Patient Active Problem List   Diagnosis Code    Abnormal cytology R89.6    Pelvic mass R19.00       SUBJECTIVE:    Eun Horn is a 61 y.o. female who presents for followupcare following TLH/BSO, 2015,     The patient's pathology revealed   FINAL PATHOLOGIC DIAGNOSIS  Uterus, bilateral fallopian tubes and ovaries, hysterectomy and bilateral salpingo-oophorectomy:  Uterus:  Cervix: Unremarkable. Endometrium: Atrophic changes. Myometrium: Cellular leiomyoma. Ovaries, bilateral:  Right ovary: Atrophic changes. Serous inclusion cyst.  Left ovary: Serous cystadenofibroma. Fallopian tubes, bilateral:  Right fallopian tube: Benign paratubal cyst.  Left fallopian tube: Benign paratubal cyst.    Cytology:      Vaginal Thin Prep   Satisfactory for evaluation. HIGH-GRADE SQUAMOUS INTRAEPITHELIAL LESION, MODERATE AND SEVERE DYSPLASIA Leila Garber II /VaIN III.     2018:  Vaginal Biopsy     FINAL PATHOLOGIC DIAGNOSIS   Left vaginal apex, biopsy:   Low-grade squamous intraepithelial neoplasia (VaIN I)     2018:   Vaginal Thin Prep   Satisfactory for evaluation. LOW-GRADE SQUAMOUS INTRAEPITHELIAL LESION, MILD DYSPLASIA (VaIN I)/ HPV EFFECT.     2017:  Cytology   LGSIL/HPV effect  12/15/2016:  Cytology   ASCUS  2016:  Cytology   Negative  Vaginal Biopsy:   Benign polyp, HPV effect    2018: The patient presents today for colposcopic evaluation following cytology suspicious for a HGSIL  No abnormal bleeding or discharge.   Negative  and GI review for dysfunction    Currently she has no problems with eating, bowel movements, voiding  Appetite is good. Eating a regular diet without difficulty. Bowel movements are regular. The patient is not having any pain. .    Medications:     Current Outpatient Prescriptions on File Prior to Visit   Medication Sig Dispense Refill    multivitamin (ONE A DAY) tablet Take 1 Tab by mouth daily.  naproxen (NAPROSYN) 500 mg tablet Take 500 mg by mouth as needed. No current facility-administered medications on file prior to visit. Allergies:   No Known Allergies  Past Medical History:   Diagnosis Date    Abnormal Pap smear of cervix 2014    ASCUS, HPV +, ? HGSIL, colpo bx negative, LEEP negative    Abnormal Pap smear of cervix 1/2015    HPV +, 16/18/45 negative    Adverse effect of anesthesia 03/06/2018    NAUSEA ONLY; \"LAST TIME THEY GAVE ME THE PATCH; I LIKE THE PATCH\"    Ill-defined condition 2015    recent bout of Bronchitis     Past Surgical History:   Procedure Laterality Date    BREAST SURGERY PROCEDURE UNLISTED      breast biopsy left 1995    HX BREAST BIOPSY Left 6/21/16    benign per pt    HX CARPAL TUNNEL RELEASE  2018    HX GYN      2 vaginal births    HX GYN      tubal ligation    HX GYN      hysterectomy 2015    HX LEEP PROCEDURE  6/26/14     Social History     Social History    Marital status:      Spouse name: N/A    Number of children: N/A    Years of education: N/A     Occupational History    Not on file.      Social History Main Topics    Smoking status: Former Smoker     Packs/day: 0.50     Years: 5.00     Quit date: 3/6/1986    Smokeless tobacco: Never Used    Alcohol use No    Drug use: No    Sexual activity: Not on file     Other Topics Concern    Not on file     Social History Narrative       OBJECTIVE:    Vitals:   Vitals:    05/17/18 1057   BP: 105/68   Pulse: 72   Weight: 129 lb (58.5 kg)   Height: 5' (1.524 m)     Physical Examination:     General:  no distress   Lungs: lungs clear to auscultation and auscultation   Cardiac: Regular rate and rhythm   Abdomen: soft, bowel sounds active, non-tender  No CVA tenderness   Incision: healing well   Pelvic: Vulva: No gross lesion. BUS negative  Vagina: Again no gross lesion, no suspicious induration or nodularity. Colposcopy with 4% [id number]. Adequate over the vaginal mucosa  Islands of AW epithelium noted on the right posterior cuff, biopsy taken. Otherwise no abnormalities. Reviewed with Lannette Douse filter    Bimanual Exam: No suspicious masses, induration or nodularity. Rectal: not done   Extremity:   extremities normal, atraumatic, no cyanosis or edema   [facility]  OFFICE PROCEDURE PROGRESS NOTE        Chart reviewed for the following:   Erik Lentz MD, have reviewed the History, Physical and updated the Allergic reactions for Tamra Fatima performed immediately prior to start of procedure:   Erik Lentz MD, have performed the following reviews on Abhishek Ruvalcaba prior to the start of the procedure:            * Patient was identified by name and date of birth   * Agreement on procedure being performed was verified  * Risks and Benefits explained to the patient  * Procedure site verified and marked as necessary  * Patient was positioned for comfort  * Consent was signed and verified     Time: 11:27 AM    Date of procedure: 5/17/2018    Procedure performed by:  Danie Davis MD    Provider assisted by: Robert King LPN    Patient assisted by: self    How tolerated by patient: tolerated the procedure well with no complications    Post Procedural Pain Scale: 2 - Hurts Little Bit    Comments: none    IMPRESSION:    Abhishek Ruvalcaba is doing well. .  She has a working diagnosis of:    ICD-10-CM ICD-9-CM    1. Abnormal cytology R89.6 792.9    2. History of carcinoma in situ of cervix Z86.001 V13.89     .     Medical problems:     Patient Active Problem List Diagnosis Code    Abnormal cytology R89.6    Pelvic mass R19.00   Suspect VAIN 1-2    PLAN:    Return 7-10 days to review.         Oksana Brown MD  5/17/2018/10:13 AM

## 2018-05-29 ENCOUNTER — OFFICE VISIT (OUTPATIENT)
Dept: GYNECOLOGY | Age: 58
End: 2018-05-29

## 2018-05-29 VITALS
SYSTOLIC BLOOD PRESSURE: 105 MMHG | BODY MASS INDEX: 25.09 KG/M2 | DIASTOLIC BLOOD PRESSURE: 75 MMHG | HEART RATE: 79 BPM | HEIGHT: 60 IN | WEIGHT: 127.8 LBS

## 2018-05-29 DIAGNOSIS — Z86.001 HISTORY OF CARCINOMA IN SITU OF CERVIX: ICD-10-CM

## 2018-05-29 DIAGNOSIS — N89.0 VAIN I (VAGINAL INTRAEPITHELIAL NEOPLASIA GRADE I): ICD-10-CM

## 2018-05-29 DIAGNOSIS — R89.6 ABNORMAL CYTOLOGY: Primary | ICD-10-CM

## 2018-05-29 NOTE — PROGRESS NOTES
27 [facility], [facility] [address], [address]  [phone]  Dr. Dalila Stevens, III    Dr. Dejon Ellis, [place], [facility]     Office Note  Patient ID:  Name: Villa Mobley  MRM: [id number]  : 1960/57 y.o. Date: 2018    Diagnosis:     ICD-10-CM ICD-9-CM    1. Abnormal cytology R89.6 792.9    2. 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 History of carcinoma in situ of cervix Z86.001 V13.89        Problem List:   Patient Active Problem List   Diagnosis Code    Abnormal cytology R89.6    Pelvic mass R19.00       SUBJECTIVE:    Villa Mobley is a 61 y.o. female who presents for followupcare following TLH/BSO, 2015,     The patient's pathology revealed   FINAL PATHOLOGIC DIAGNOSIS  Uterus, bilateral fallopian tubes and ovaries, hysterectomy and bilateral salpingo-oophorectomy:  Uterus:  Cervix: Unremarkable. Endometrium: Atrophic changes. Myometrium: Cellular leiomyoma. Ovaries, bilateral:  Right ovary: Atrophic changes. Serous inclusion cyst.  Left ovary: Serous cystadenofibroma. Fallopian tubes, bilateral:  Right fallopian tube: Benign paratubal cyst.  Left fallopian tube: Benign paratubal cyst.    2018:  Vaginal Biopsy:     FINAL PATHOLOGIC DIAGNOSIS   Vaginal apex, right, biopsy:   Detached fragments of squamous mucosa with low-grade dysplasia (VaIN 1)   See comment   Comment   Fragments are detached. Immunohistochemical stain for P16 fails to show strong diffuse positivity consistent with a low grade lesion. CYTOLOGY: 2018   Vaginal Thin Prep   Satisfactory for evaluation. HIGH-GRADE SQUAMOUS INTRAEPITHELIAL LESION, MODERATE AND SEVERE DYSPLASIA Kimberly Romp II /VaIN III.     2018:  Vaginal Biopsy   FINAL PATHOLOGIC DIAGNOSIS   Left vaginal apex, biopsy:   Low-grade squamous intraepithelial neoplasia (VaIN I)     2018:   Vaginal Thin Prep   Satisfactory for evaluation. LOW-GRADE SQUAMOUS INTRAEPITHELIAL LESION, MILD DYSPLASIA (VaIN I)/ HPV EFFECT.     6/2017:  Cytology   LGSIL/HPV effect  12/15/2016:  Cytology   ASCUS  6/13/2016:  Cytology   Negative  Vaginal Biopsy:   Benign polyp, HPV effect    5/29/2018: The patient preesents to discuss recent vaginal biopsy, VAIN I    The patient denies and bleeding or discharge  Currently she has no problems with eating, bowel movements, voiding  Appetite is good. Eating a regular diet without difficulty. Bowel movements are regular. The patient is not having any pain. .    Medications:     Current Outpatient Prescriptions on File Prior to Visit   Medication Sig Dispense Refill    multivitamin (ONE A DAY) tablet Take 1 Tab by mouth daily.  naproxen (NAPROSYN) 500 mg tablet Take 500 mg by mouth as needed. No current facility-administered medications on file prior to visit. Allergies:   No Known Allergies  Past Medical History:   Diagnosis Date    Abnormal Pap smear of cervix 2014    ASCUS, HPV +, ? HGSIL, colpo bx negative, LEEP negative    Abnormal Pap smear of cervix 1/2015    HPV +, 16/18/45 negative    Adverse effect of anesthesia 03/06/2018    NAUSEA ONLY; \"LAST TIME THEY GAVE ME THE PATCH; I LIKE THE PATCH\"    Ill-defined condition 2015    recent bout of Bronchitis     Past Surgical History:   Procedure Laterality Date    BREAST SURGERY PROCEDURE UNLISTED      breast biopsy left 1995    HX BREAST BIOPSY Left 6/21/16    benign per pt    HX CARPAL TUNNEL RELEASE  2018    HX GYN      2 vaginal births    HX GYN      tubal ligation    HX GYN      hysterectomy 2015    HX LEEP PROCEDURE  6/26/14     Social History     Social History    Marital status:      Spouse name: N/A    Number of children: N/A    Years of education: N/A     Occupational History    Not on file.      Social History Main Topics    Smoking status: Former Smoker     Packs/day: 0.50     Years: 5.00     Quit date: 3/6/1986    Smokeless tobacco: Never Used    Alcohol use No    Drug use: No    Sexual activity: Not on file     Other Topics Concern    Not on file     Social History Narrative       OBJECTIVE:    Vitals:   Vitals:    05/29/18 1146   BP: 105/75   Pulse: 79   Weight: 127 lb 12.8 oz (58 kg)   Height: 5' (1.524 m)     Physical Examination: Prior examination     General:  no distress   Lungs: lungs clear to auscultation and auscultation   Cardiac: Regular rate and rhythm   Abdomen: soft, bowel sounds active, non-tender  No CVA tenderness   Incision: healing well   Pelvic: Vulva: No gross lesion. BUS negative  Vagina: Again no gross lesion, no suspicious induration or nodularity. Cytology taken without friability. Bimanual Exam: No suspicious masses, induration or nodularity. Rectal: not done   Extremity:   extremities normal, atraumatic, no cyanosis or edema     IMPRESSION:    Adebayo Lind is doing well. .  She has a working diagnosis of:    ICD-10-CM ICD-9-CM    1. Abnormal cytology R89.6 792.9    2. 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 History of carcinoma in situ of cervix Z86.001 V13.89     . Medical problems:     Patient Active Problem List   Diagnosis Code    Abnormal cytology R89.6    Pelvic mass R19.00       PLAN:    Options:   Conservative observation, repap in 2-3 months    If HGSIL demonstrated, patient will consider EUA/Bx, possible laser    EUA, biopsies, possible laser vaginectomy     Rationale discussed. This is certainly a mixed presentation. Without evidence of a HG pre-invasive process I am hesitant to see benefit from EUA etc.    I have discussed this with the patient who desires conservative observation. Further consultation following cytology in 2-3 months. Return 2-3 months or PRN symptoms. All questions answered. Corinne Salgado MD  5/29/2018/10:13 AM    Defined Sensitive Document    CC:   Kiersten Garcia MD   No ref.  provider found

## 2018-07-17 ENCOUNTER — TELEPHONE (OUTPATIENT)
Dept: GYNECOLOGY | Age: 58
End: 2018-07-17

## 2018-07-18 NOTE — TELEPHONE ENCOUNTER
S/w pt, she states she had some pink, scant spotting last week, no pain and she states it is coming from the vagina, pt has an debbie ton 7/24/18 and she was advised to keep that appt, pt verbalized understanding

## 2018-07-24 ENCOUNTER — HOSPITAL ENCOUNTER (OUTPATIENT)
Dept: LAB | Age: 58
Discharge: HOME | End: 2018-07-24
Payer: COMMERCIAL

## 2018-07-24 ENCOUNTER — OFFICE VISIT (OUTPATIENT)
Dept: GYNECOLOGY | Age: 58
End: 2018-07-24

## 2018-07-24 VITALS
DIASTOLIC BLOOD PRESSURE: 98 MMHG | WEIGHT: 126.4 LBS | BODY MASS INDEX: 24.81 KG/M2 | SYSTOLIC BLOOD PRESSURE: 126 MMHG | HEART RATE: 59 BPM | HEIGHT: 60 IN

## 2018-07-24 DIAGNOSIS — R87.629 ABNORMAL VAGINAL CYTOLOGY: ICD-10-CM

## 2018-07-24 DIAGNOSIS — N93.9 VAGINAL SPOTTING: ICD-10-CM

## 2018-07-24 DIAGNOSIS — Z86.001 HISTORY OF CARCINOMA IN SITU OF CERVIX: Primary | ICD-10-CM

## 2018-07-24 PROCEDURE — 88142 CYTOPATH C/V THIN LAYER: CPT | Performed by: OBSTETRICS & GYNECOLOGY

## 2018-07-24 NOTE — PROGRESS NOTES
27 [facility] [address], [address]  [phone]  Dr. Sosa Hoffmann, III    Kaylen Cardenas Guardian, [facility]     Office Note  Patient ID:  Name: Sita Allan  MRM: [id number]  : 1960/57 y.o. Date: 2018    Diagnosis:     ICD-10-CM ICD-9-CM    1. History of carcinoma in situ of cervix Z86.001 V13.89    2. Abnormal vaginal cytology R89.6 795.10    3. Vaginal spotting N92.0 623.8        Problem List:   Patient Active Problem List   Diagnosis Code    Abnormal cytology R89.6    Pelvic mass R19.00       SUBJECTIVE:    Sita Allan is a 61 y.o. female who presents for followupcare following TLH/BSO, 2015,     The patient's pathology revealed   FINAL PATHOLOGIC DIAGNOSIS  Uterus, bilateral fallopian tubes and ovaries, hysterectomy and bilateral salpingo-oophorectomy:  Uterus:  Cervix: Unremarkable. Endometrium: Atrophic changes. Myometrium: Cellular leiomyoma. Ovaries, bilateral:  Right ovary: Atrophic changes. Serous inclusion cyst.  Left ovary: Serous cystadenofibroma. Fallopian tubes, bilateral:  Right fallopian tube: Benign paratubal cyst.  Left fallopian tube: Benign paratubal cyst.    2018:  Vaginal Biopsy:     FINAL PATHOLOGIC DIAGNOSIS   Vaginal apex, right, biopsy:   Detached fragments of squamous mucosa with low-grade dysplasia (VaIN 1)   See comment   Comment   Fragments are detached. Immunohistochemical stain for P16 fails to show strong diffuse positivity consistent with a low grade lesion. CYTOLOGY: 2018   Vaginal Thin Prep   Satisfactory for evaluation. HIGH-GRADE SQUAMOUS INTRAEPITHELIAL LESION, MODERATE AND SEVERE DYSPLASIA [address] II /VaIN III.     2018:  Vaginal Biopsy   FINAL PATHOLOGIC DIAGNOSIS   Left vaginal apex, biopsy:   Low-grade squamous intraepithelial neoplasia (VaIN I)     2018:   Vaginal Thin Prep   Satisfactory for evaluation.    LOW-GRADE SQUAMOUS INTRAEPITHELIAL LESION, MILD DYSPLASIA (VaIN I)/ HPV EFFECT.     6/2017:  Cytology   LGSIL/HPV effect  12/15/2016:  Cytology   ASCUS  6/13/2016:  Cytology   Negative  Vaginal Biopsy:   Benign polyp, HPV effect    7/24/2018: T  The patient denies and bleeding or discharge  Currently she has no problems with eating, bowel movements, voiding  Appetite is good. Eating a regular diet without difficulty. Bowel movements are regular. The patient is not having any pain. .    Medications:     Current Outpatient Prescriptions on File Prior to Visit   Medication Sig Dispense Refill    multivitamin (ONE A DAY) tablet Take 1 Tab by mouth daily.  naproxen (NAPROSYN) 500 mg tablet Take 500 mg by mouth as needed. No current facility-administered medications on file prior to visit. Allergies:   No Known Allergies  Past Medical History:   Diagnosis Date    Abnormal Pap smear of cervix 2014    ASCUS, HPV +, ? HGSIL, colpo bx negative, LEEP negative    Abnormal Pap smear of cervix 1/2015    HPV +, 16/18/45 negative    Adverse effect of anesthesia 03/06/2018    NAUSEA ONLY; \"LAST TIME THEY GAVE ME THE PATCH; I LIKE THE PATCH\"    Ill-defined condition 2015    recent bout of Bronchitis     Past Surgical History:   Procedure Laterality Date    BREAST SURGERY PROCEDURE UNLISTED      breast biopsy left 1995    HX BREAST BIOPSY Left 6/21/16    benign per pt    HX CARPAL TUNNEL RELEASE  2018    HX GYN      2 vaginal births    HX GYN      tubal ligation    HX GYN      hysterectomy 2015    HX LEEP PROCEDURE  6/26/14     Social History     Social History    Marital status:      Spouse name: N/A    Number of children: N/A    Years of education: N/A     Occupational History    Not on file.      Social History Main Topics    Smoking status: Former Smoker     Packs/day: 0.50     Years: 5.00     Quit date: 3/6/1986    Smokeless tobacco: Never Used    Alcohol use No    Drug use: No    Sexual activity: Not on file     Other Topics Concern    Not on file     Social History Narrative       OBJECTIVE:    Vitals:   Vitals:    07/24/18 1009   BP: (!) 126/98   Pulse: (!) 59   Weight: 126 lb 6.4 oz (57.3 kg)   Height: 5' (1.524 m)     Physical Examination: Prior examination     General:  no distress   Lungs: lungs clear to auscultation and auscultation   Cardiac: Regular rate and rhythm   Abdomen: soft, bowel sounds active, non-tender  No CVA tenderness   Incision: healing well   Pelvic: Vulva: No gross lesion. BUS negative  Vagina: Again no gross lesion, no suspicious induration or nodularity. Cytology taken without friability. Bimanual Exam: No suspicious masses, induration or nodularity. Rectal: not done. External hemorrhoids noted, chronic   Extremity:   extremities normal, atraumatic, no cyanosis or edema     IMPRESSION:    Macarena Posadas is doing well. .  She has a working diagnosis of:    ICD-10-CM ICD-9-CM    1. History of carcinoma in situ of cervix Z86.001 V13.89    2. Abnormal vaginal cytology R89.6 795.10    3. Vaginal spotting N92.0 623.8     KYREE  Medical problems:     Patient Active Problem List   Diagnosis Code    Abnormal cytology R89.6    Pelvic mass R19.00       PLAN:     No evidence of a vaginal lesion. Cytology pending. Return 2-3 months or PRN symptoms pending cytology    All questions answered. Jean Phoenix, MD  7/24/2018/10:13 AM    Defined Sensitive Document    CC:   Aris Payton MD   No ref.  provider found

## 2018-07-24 NOTE — PROGRESS NOTES
3 month check up, pt complains of light spotting, scant/tinge with wiping, off and on, pink to red in color    1. Have you been to the ER, urgent care clinic since your last visit? Hospitalized since your last visit?  no    2. Have you seen or consulted any other health care providers outside of the [address] [place] [address] since your last visit? Include any pap smears or colon screening.    no

## 2018-08-06 NOTE — PROGRESS NOTES
Patient notified of results, pt verbalized understanding of results and MD's recommendations, pt has an appt on 10/23/18 with Dr. Jesus Alberto Hogan, tickled for follow up

## 2018-10-19 NOTE — PROGRESS NOTES
3 month check up, pt reports no abnormal spotting or bleeding, pt states she has no questions or concerns for today's visit, new family history, pt's sister diagnosed with Breast Cancer at age 57    3. Have you been to the ER, urgent care clinic since your last visit? Hospitalized since your last visit?  no    2. Have you seen or consulted any other health care providers outside of the [facility] since your last visit? Include any pap smears or colon screening.    no

## 2018-10-23 ENCOUNTER — HOSPITAL ENCOUNTER (OUTPATIENT)
Dept: LAB | Age: 58
Discharge: HOME | End: 2018-10-23
Payer: COMMERCIAL

## 2018-10-23 ENCOUNTER — OFFICE VISIT (OUTPATIENT)
Dept: GYNECOLOGY | Age: 58
End: 2018-10-23

## 2018-10-23 VITALS
SYSTOLIC BLOOD PRESSURE: 111 MMHG | HEIGHT: 60 IN | WEIGHT: 122.8 LBS | HEART RATE: 80 BPM | DIASTOLIC BLOOD PRESSURE: 68 MMHG | BODY MASS INDEX: 24.11 KG/M2

## 2018-10-23 DIAGNOSIS — N89.0 MILD VAGINAL DYSPLASIA: Primary | ICD-10-CM

## 2018-10-23 PROCEDURE — 88142 CYTOPATH C/V THIN LAYER: CPT | Performed by: OBSTETRICS & GYNECOLOGY

## 2018-10-23 NOTE — PROGRESS NOTES
[facility]  [address] [facility], [address], [address]  [phone]  [facility] [phone]       Office Note  Patient ID:  Name: Franny Hillman  MRM: [id number]  : 1960/57 y.o. 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 female who presents for continued surveillance for vaginal dysplasia. Previously underwent TLH/BSO, 2015 for a benign pelvic mass. Patient had a history of cervical dysplasia prior to her hysterectomy, although non on final pathology. HSIL pap in 2018, followed by VAIN 1 biopsy 18. F/u pap 18 with ASCUS. Presents today for repeat pap smear. Denies all complaints. Denies vaginal bleeding/discharge, pelvic pain, urinary symptoms, change in appetite or bowel habits. Pathology Review  FINAL PATHOLOGIC DIAGNOSIS  Uterus, bilateral fallopian tubes and ovaries, hysterectomy and bilateral salpingo-oophorectomy:  Uterus:  Cervix: Unremarkable. Endometrium: Atrophic changes. Myometrium: Cellular leiomyoma. Ovaries, bilateral:  Right ovary: Atrophic changes. Serous inclusion cyst.  Left ovary: Serous cystadenofibroma. Fallopian tubes, bilateral:  Right fallopian tube: Benign paratubal cyst.  Left fallopian tube: Benign paratubal cyst.    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 Immunohistochemical stain for P16 fails to show strong diffuse positivity consistent with a low grade lesion. 18:   Vaginal Thin Prep   Satisfactory for evaluation. RARE ATYPICAL SQUAMOUS CELLS OF UNDETERMINED SIGNIFICANCE.        Medications:     Current Outpatient Medications on File Prior to Visit   Medication Sig Dispense Refill    multivitamin (ONE A DAY) tablet Take 1 Tab by mouth daily.  naproxen (NAPROSYN) 500 mg tablet Take 500 mg by mouth as needed. No current facility-administered medications on file prior to visit. Allergies:   No Known Allergies  Past Medical History:   Diagnosis Date    Abnormal Pap smear of cervix     ASCUS, HPV +, ? HGSIL, colpo bx negative, LEEP negative    Abnormal Pap smear of cervix 2015    HPV +, 16/18/45 negative    Adverse effect of anesthesia 2018    NAUSEA ONLY; \"LAST TIME THEY GAVE ME THE PATCH; I LIKE THE PATCH\"    Ill-defined condition     recent bout of Bronchitis     Past Surgical History:   Procedure Laterality Date    BREAST SURGERY PROCEDURE UNLISTED      breast biopsy left     HX BREAST BIOPSY Left 16    benign per pt    HX CARPAL TUNNEL RELEASE      HX GYN      2 vaginal births    HX GYN      tubal ligation    HX GYN      hysterectomy     HX LEEP PROCEDURE  14     Social History     Socioeconomic History    Marital status:      Spouse name: Not on file    Number of children: Not on file    Years of education: Not on file    Highest education level: Not on file   Social Needs    Financial resource strain: Not on file    Food insecurity - worry: Not on file    Food insecurity - inability: Not on file   [employer] needs - medical: Not on file   [employer] needs - non-medical: Not on file   Occupational History    Not on file   Tobacco Use    Smoking status: Former Smoker     Packs/day: 0.50     Years: 5.00     Pack years: 2.50     Last attempt to quit: 3/6/1986     Years since quittin.6    Smokeless tobacco: Never Used   Substance and Sexual Activity    Alcohol use: No     Alcohol/week: 0.0 oz    Drug use: No    Sexual activity: Not on file   Other Topics Concern    Not on file   Social History Narrative    Not on file       OBJECTIVE:    Vitals:   Vitals:    10/23/18 1040   BP: 111/68   Pulse: 80   Weight: 122 lb 12.8 oz (55.7 kg)   Height: 5' (1.524 m)     Physical Exam:  General: Alert and oriented. No acute distress. Well-nourished  HEENT: No thyroid enlargment. NO JVD. Neck supple without restrictions. Sclera normal. Normal occular motion. Moist mucous membranes. Lymphatics: No evidence of axillary, cervical, or subclavicular adenopathy. Respiratory: clear to auscultation and percussion to the bases. No CVAT. Cardiovascular: regular rate and rhythm. No murmurs, rubs, or gallops. Gastrointestinal: soft, non-tender, non-distended, no masses or organomegaly. Well-healed incision. Musculoskeletal: normal gait. No joint tenderness, deformity or swelling. No muscular tenderness. Extremities: extremities normal, atraumatic, no cyanosis or edema. Pelvic: exam chaperoned by nurse. Normal appearing external genitalia. On speculum exam, the vagina is atrophic. The uterus and cervix are surgically absent. No evidence of masses or nodularity on bimanual exam. Deferred rectovaginal exam. Cytology collected  Neuro: Grossly intact. Normal gait and movement. No acute deficit  Skin: No evidence of rashes or skin changes. IMPRESSION/PLAN:    Ms. Rita Connors is a 61 y.o.  female who presents for continued surveillance for vaginal dysplasia. Previously underwent TLH/BSO, 6/22/2015. Problem List Items Addressed This Visit        Reproductive    Mild vaginal dysplasia - Primary        Reviewed patient's course to date. HSIL pap 4/2018, followed by VAIN 1 biopsy 5/2018. F/u pap 7/2018 consistent with ASCUS. Reassured patient of normal exam today. Cytology collected. If low-grade or normal, RTC in 4 months. Reviewed precautionary symptoms to return to clinic sooner. All questions and concerns were addressed with the patient and she is comfortable with the plan. Agustina Stevens MD         CC:   Suzzane Primrose, MD   No ref.  provider found

## 2018-10-29 NOTE — PROGRESS NOTES
Leopoldo Del,    Her vaginal pap smear is normal. Return to clinic as scheduled.      Thanks,    Janel Ashraf MD

## 2018-10-30 NOTE — PROGRESS NOTES
Normal letter sent to address on file.   Pt already has a 4 month follow up appt scheduled for 2/26/19 at 11am

## 2018-12-04 ENCOUNTER — OFFICE VISIT (OUTPATIENT)
Dept: PRIMARY CARE CLINIC | Age: 58
End: 2018-12-04

## 2018-12-04 VITALS
BODY MASS INDEX: 23.52 KG/M2 | RESPIRATION RATE: 19 BRPM | TEMPERATURE: 98.1 F | HEIGHT: 60 IN | OXYGEN SATURATION: 98 % | SYSTOLIC BLOOD PRESSURE: 117 MMHG | HEART RATE: 87 BPM | DIASTOLIC BLOOD PRESSURE: 75 MMHG | WEIGHT: 119.8 LBS

## 2018-12-04 DIAGNOSIS — J06.9 VIRAL URI WITH COUGH: Primary | ICD-10-CM

## 2018-12-04 RX ORDER — CODEINE PHOSPHATE AND GUAIFENESIN 10; 100 MG/5ML; MG/5ML
5 SOLUTION ORAL
Qty: 118 ML | Refills: 0 | Status: SHIPPED | OUTPATIENT
Start: 2018-12-04 | End: 2019-02-26 | Stop reason: ALTCHOICE

## 2018-12-04 RX ORDER — PSEUDOEPHEDRINE HCL 30 MG
60 TABLET ORAL 3 TIMES DAILY
Qty: 24 TAB | Refills: 1 | Status: SHIPPED | OUTPATIENT
Start: 2018-12-04 | End: 2018-12-07

## 2018-12-04 RX ORDER — NAPROXEN 500 MG/1
500 TABLET ORAL
COMMUNITY
End: 2018-12-04 | Stop reason: ALTCHOICE

## 2018-12-04 NOTE — PROGRESS NOTES
Subjective:   Jose Segura is a 60 y.o. female who complains of congestion, sore throat, nasal blockage, dry cough and fever for 5 days, gradually worsening since that time. She states she has had a subjective low grade fever for a few days, tylenol is helping. The cough is severe at times and keeping her awake. She denies a history of shortness of breath and wheezing. Evaluation to date: none. Treatment to date: OTC products. Patient does not smoke cigarettes. Relevant PMH: No pertinent additional PMH. 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 Max Daily Amount: 15 mL. 118 mL 0    pseudoephedrine (SUDAFED) 30 mg tablet Take 2 Tabs by mouth three (3) times daily for 3 days. 24 Tab 1    multivitamin (ONE A DAY) tablet Take 1 Tab by mouth daily. No Known Allergies  Past Medical History:   Diagnosis Date    Abnormal Pap smear of cervix 2014    ASCUS, HPV +, ? HGSIL, colpo bx negative, LEEP negative    Abnormal Pap smear of cervix 1/2015    HPV +, 16/18/45 negative    Adverse effect of anesthesia 03/06/2018    NAUSEA ONLY; \"LAST TIME THEY GAVE ME THE PATCH; I LIKE THE PATCH\"    Ill-defined condition 2015    recent bout of Bronchitis     Past Surgical History:   Procedure Laterality Date    BREAST SURGERY PROCEDURE UNLISTED      breast biopsy left 1995    HX BREAST BIOPSY Left 6/21/16    benign per pt    HX CARPAL TUNNEL RELEASE  2018    HX GYN      2 vaginal births    HX GYN      tubal ligation    HX GYN      hysterectomy 2015    HX LEEP PROCEDURE  6/26/14 Family History   Problem Relation Age of Onset    Cancer Mother 28        breast cancer    Heart Disease Father     Heart Attack Father 77         OF    Breast Cancer Sister         dx at age 61   Ceferino Atkinson Anesth Problems Neg Hx      Social History     Tobacco Use    Smoking status: Former Smoker     Packs/day: 0.50     Years: 5.00     Pack years: 2.50     Last attempt to quit: 3/6/1986     Years since quittin.7    Smokeless tobacco: Never Used   Substance Use Topics    Alcohol use: No     Alcohol/week: 0.0 oz        Review of Systems  Pertinent items are noted in HPI. Objective:     Visit Vitals  /75 (BP 1 Location: Left arm, BP Patient Position: Sitting)   Pulse 87   Temp 98.1 °F (36.7 °C) (Oral)   Resp 19   Ht 5' (1.524 m)   Wt 119 lb 12.8 oz (54.3 kg)   SpO2 98%   BMI 23.40 kg/m²     General:  alert, cooperative, no distress   Eyes: negative   Ears: normal TM's and external ear canals AU   Sinuses: Normal paranasal sinuses without tenderness   Mouth:  Lips, mucosa, and tongue normal. Teeth and gums normal. Pharynx red, post nasal drip white/ yellow. Neck: supple, symmetrical, trachea midline and no adenopathy. Heart: S1 and S2 normal, no murmurs noted. Lungs: clear to auscultation bilaterally, frequent dry hacking cough. Abdomen: soft, non-tender. Bowel sounds normal. No masses,  no organomegaly        Assessment/Plan:   viral upper respiratory illness  Push po fluids and rest.  Suggested symptomatic OTC remedies. RTC prn. Discussed diagnosis and treatment of viral URIs. Discussed the importance of avoiding unnecessary antibiotic therapy. ICD-10-CM ICD-9-CM    1. Viral URI with cough J06.9 465.9 guaiFENesin-codeine (GUAIATUSSIN AC) 100-10 mg/5 mL solution    B97.89  pseudoephedrine (SUDAFED) 30 mg tablet   .

## 2018-12-04 NOTE — PROGRESS NOTES
Chief Complaint   Patient presents with    Cold Symptoms   pt c/o stuffy nose, sore throat, coughing and low grade temperature, pt states symptoms started over the weekend, pt states she has tried otc Mucinex to help with discomfort. This note will not be viewable in [address]

## 2018-12-04 NOTE — PATIENT INSTRUCTIONS

## 2019-02-26 ENCOUNTER — HOSPITAL ENCOUNTER (OUTPATIENT)
Dept: LAB | Age: 59
Discharge: HOME | End: 2019-02-26
Payer: COMMERCIAL

## 2019-02-26 ENCOUNTER — OFFICE VISIT (OUTPATIENT)
Dept: GYNECOLOGY | Age: 59
End: 2019-02-26

## 2019-02-26 VITALS
HEART RATE: 88 BPM | SYSTOLIC BLOOD PRESSURE: 110 MMHG | DIASTOLIC BLOOD PRESSURE: 79 MMHG | WEIGHT: 120.4 LBS | BODY MASS INDEX: 23.64 KG/M2 | HEIGHT: 60 IN

## 2019-02-26 DIAGNOSIS — N89.0 MILD VAGINAL DYSPLASIA: Primary | ICD-10-CM

## 2019-02-26 PROCEDURE — 88142 CYTOPATH C/V THIN LAYER: CPT

## 2019-02-26 NOTE — PROGRESS NOTES
[facility]  [address] [facility], [address], [address]  [phone]  [facility] [phone]       Office Note  Patient ID:  Name: Zandra García  MRM: [id number]  : 1960/58 y.o. 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 female who presents for continued surveillance for vaginal dysplasia. Previously underwent TLH/BSO, 2015 for a benign pelvic mass. Patient had a history of cervical dysplasia prior to her hysterectomy, although non on final pathology. HSIL pap in 2018, followed by VAIN 1 biopsy 18. F/u pap 18 with ASCUS. Pap 10/2018 NILM. Presents today for repeat pap smear. Denies all complaints. Denies vaginal bleeding/discharge, pelvic pain, urinary symptoms, change in appetite or bowel habits. Pathology Review  FINAL PATHOLOGIC DIAGNOSIS  Uterus, bilateral fallopian tubes and ovaries, hysterectomy and bilateral salpingo-oophorectomy:  Uterus:  Cervix: Unremarkable. Endometrium: Atrophic changes. Myometrium: Cellular leiomyoma. Ovaries, bilateral:  Right ovary: Atrophic changes. Serous inclusion cyst.  Left ovary: Serous cystadenofibroma. Fallopian tubes, bilateral:  Right fallopian tube: Benign paratubal cyst.  Left fallopian tube: Benign paratubal cyst.    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 Immunohistochemical stain for P16 fails to show strong diffuse positivity consistent with a low grade lesion. 18:   Vaginal Thin Prep   Satisfactory for evaluation. RARE ATYPICAL SQUAMOUS CELLS OF UNDETERMINED SIGNIFICANCE.        Medications:     Current Outpatient Medications on File Prior to Visit Medication Sig Dispense Refill    multivitamin (ONE A DAY) tablet Take 1 Tab by mouth daily. No current facility-administered medications on file prior to visit. Allergies:   No Known Allergies  Past Medical History:   Diagnosis Date    Abnormal Pap smear of cervix     ASCUS, HPV +, ? HGSIL, colpo bx negative, LEEP negative    Abnormal Pap smear of cervix 2015    HPV +, 16/18/45 negative    Adverse effect of anesthesia 2018    NAUSEA ONLY; \"LAST TIME THEY GAVE ME THE PATCH; I LIKE THE PATCH\"    Ill-defined condition     recent bout of Bronchitis     Past Surgical History:   Procedure Laterality Date    BREAST SURGERY PROCEDURE UNLISTED      breast biopsy left     HX BREAST BIOPSY Left 16    benign per pt    HX CARPAL TUNNEL RELEASE      HX GYN      2 vaginal births    HX GYN      tubal ligation    HX GYN      hysterectomy     HX LEEP PROCEDURE  14     Social History     Socioeconomic History    Marital status:      Spouse name: Not on file    Number of children: Not on file    Years of education: Not on file    Highest education level: Not on file   Social Needs    Financial resource strain: Not on file    Food insecurity - worry: Not on file    Food insecurity - inability: Not on file   [employer] needs - medical: Not on file   [employer] needs - non-medical: Not on file   Occupational History    Not on file   Tobacco Use    Smoking status: Former Smoker     Packs/day: 0.50     Years: 5.00     Pack years: 2.50     Last attempt to quit: 3/6/1986     Years since quittin.0    Smokeless tobacco: Never Used   Substance and Sexual Activity    Alcohol use: No     Alcohol/week: 0.0 oz    Drug use: No    Sexual activity: Not on file   Other Topics Concern    Not on file   Social History Narrative    Not on file       OBJECTIVE:    Vitals:   Vitals:    19 1321   BP: 110/79   Pulse: 88   Weight: 120 lb 6.4 oz (54.6 kg) Height: 5' (1.524 m)     Physical Exam:  General: Alert and oriented. No acute distress. Well-nourished  HEENT: No thyroid enlargment. NO JVD. Neck supple without restrictions. Sclera normal. Normal occular motion. Moist mucous membranes. Lymphatics: No evidence of axillary, cervical, or subclavicular adenopathy. Respiratory: clear to auscultation and percussion to the bases. No CVAT. Cardiovascular: regular rate and rhythm. No murmurs, rubs, or gallops. Gastrointestinal: soft, non-tender, non-distended, no masses or organomegaly. Well-healed incision. Musculoskeletal: normal gait. No joint tenderness, deformity or swelling. No muscular tenderness. Extremities: extremities normal, atraumatic, no cyanosis or edema. Pelvic: exam chaperoned by nurse. Normal appearing external genitalia. On speculum exam, the vagina is atrophic. The uterus and cervix are surgically absent. No evidence of masses or nodularity on bimanual exam. Deferred rectovaginal exam. Cytology collected  Neuro: Grossly intact. Normal gait and movement. No acute deficit  Skin: No evidence of rashes or skin changes. IMPRESSION/PLAN:    Ms. Clayton Barron is a 60 y.o.  female who presents for continued surveillance for vaginal dysplasia. Previously underwent TLH/BSO, 6/22/2015. Problem List Items Addressed This Visit        Reproductive    Mild vaginal dysplasia - Primary        Reviewed patient's course to date. HSIL pap 4/2018, followed by VAIN 1 biopsy 5/2018. F/u pap 7/2018 consistent with ASCUS. Pap 10/2018 NILM. Reassured patient of normal exam today. Cytology collected. If low-grade or normal, RTC in 4 months. May extend follow-up to q6 months at that time. Reviewed precautionary symptoms to return to clinic sooner. All questions and concerns were addressed with the patient and she is comfortable with the plan. Cesario Cui MD     CC:   Alyssia Ching MD   No ref.  provider found

## 2019-02-26 NOTE — PROGRESS NOTES
4 month check up for vaginal dysplasia, pt reports no abnormal spotting or bleeding, pt states she has no questions or concerns for today's visit    1. Have you been to the ER, urgent care clinic since your last visit? Hospitalized since your last visit?  no    2. Have you seen or consulted any other health care providers outside of the [address] [place] [address] since your last visit? Include any pap smears or colon screening.    no

## 2019-05-06 ENCOUNTER — OFFICE VISIT (OUTPATIENT)
Dept: PRIMARY CARE CLINIC | Age: 59
End: 2019-05-06

## 2019-05-06 VITALS
BODY MASS INDEX: 21.99 KG/M2 | DIASTOLIC BLOOD PRESSURE: 83 MMHG | WEIGHT: 112 LBS | RESPIRATION RATE: 16 BRPM | SYSTOLIC BLOOD PRESSURE: 116 MMHG | TEMPERATURE: 98 F | HEART RATE: 106 BPM | HEIGHT: 60 IN | OXYGEN SATURATION: 98 %

## 2019-05-06 DIAGNOSIS — R53.83 FATIGUE, UNSPECIFIED TYPE: ICD-10-CM

## 2019-05-06 DIAGNOSIS — R05.9 COUGH: ICD-10-CM

## 2019-05-06 DIAGNOSIS — R53.81 MALAISE: Primary | ICD-10-CM

## 2019-05-06 LAB
QUICKVUE INFLUENZA TEST: NEGATIVE
S PYO AG THROAT QL: NEGATIVE
VALID INTERNAL CONTROL?: YES
VALID INTERNAL CONTROL?: YES

## 2019-05-06 RX ORDER — DOXYCYCLINE 100 MG/1
100 CAPSULE ORAL 2 TIMES DAILY
Qty: 20 CAP | Refills: 0 | Status: SHIPPED | OUTPATIENT
Start: 2019-05-06 | End: 2019-05-16

## 2019-05-06 NOTE — PROGRESS NOTES
Subjective:   Lor Marcano is a 60 y.o. female who complains of not feeling well for 6 days, gradually worsening since that time. Pt reports illness starting like a cold 5-6 days ago. Congestion improved after 2 doses of Sudafed. Then felt like she got \"hit by a bus\". Complains of malaise, fatigue, headache (intermittent, frontal), anorexia, nausea (with movement). States she's sleeping all day long, reduced appetite and fluid intake. Reports occasional productive cough of yellow/green phlegm. Some post-nasal drainage but no sore throat. No vomiting or diarrhea. Had low grade fever with illness onset but denies any recent fevers. Pt states she felt similar to this years ago when she had strep throat. Denies any sick contacts. Works as NICU nurse. She denies a history of shortness of breath and wheezing. Evaluation to date: none. Treatment to date: decongestants, OTC products. Patient does not smoke cigarettes. Relevant PMH:   Past Medical History:   Diagnosis Date    Abnormal Pap smear of cervix 2014    ASCUS, HPV +, ? HGSIL, colpo bx negative, LEEP negative    Abnormal Pap smear of cervix 1/2015    HPV +, 16/18/45 negative    Adverse effect of anesthesia 03/06/2018    NAUSEA ONLY; \"LAST TIME THEY GAVE ME THE PATCH; I LIKE THE PATCH\"    Ill-defined condition 2015    recent bout of Bronchitis     Past Surgical History:   Procedure Laterality Date    BREAST SURGERY PROCEDURE UNLISTED      breast biopsy left 1995    HX BREAST BIOPSY Left 6/21/16    benign per pt    HX CARPAL TUNNEL RELEASE  2018    HX GYN      2 vaginal births    HX GYN      tubal ligation    HX GYN      hysterectomy 2015    HX LEEP PROCEDURE  6/26/14     No Known Allergies        Review of Systems  Pertinent items are noted in HPI.     Objective:     Visit Vitals  /83   Pulse (!) 106   Temp 98 °F (36.7 °C) (Oral)   Resp 16   Ht 5' (1.524 m)   Wt 112 lb (50.8 kg)   SpO2 98%   BMI 21.87 kg/m²     General: alert, cooperative, no distress   Eyes: negative   Ears: normal TM's and external ear canals AU   Sinuses: Normal paranasal sinuses without tenderness   Mouth:  Lips, mucosa, and tongue normal. Teeth and gums normal and normal findings: oropharynx pink & moist without lesions or evidence of thrush and post-nasal drainage noted   Neck: supple, symmetrical, trachea midline and no adenopathy. Heart: S1 and S2 normal, no murmurs noted. Lungs: clear to auscultation bilaterally   Abdomen: soft, non-tender. Bowel sounds normal. No masses,  no organomegaly        RST and rapid flu swab - negative  CXR - appears normal      Assessment/Plan:       ICD-10-CM ICD-9-CM    1. Malaise R53.81 780.79 AMB POC RAPID INFLUENZA TEST      AMB POC RAPID STREP A      XR CHEST PA LAT      CBC WITH AUTOMATED DIFF      METABOLIC PANEL, COMPREHENSIVE   2. Fatigue, unspecified type R53.83 780.79 CBC WITH AUTOMATED DIFF      METABOLIC PANEL, COMPREHENSIVE   3. Cough R05 786.2 AMB POC RAPID INFLUENZA TEST      XR CHEST PA LAT      CBC WITH AUTOMATED DIFF     Suspect viral etiology or may be developing CAP. Will cover with doxycycline and will check labs. Recommend she f/u with her PCP in 2-3 days. Fluids, rest.  Suggested symptomatic OTC remedies. RTC prn. Stephanie Dorman NP  This note will not be viewable in [address]

## 2019-05-06 NOTE — PROGRESS NOTES
Chief Complaint   Patient presents with    Cold Symptoms     Pt cough, sinus pressure , nausea and fatigue x 4 days. Pt has taken sudafed for symptoms.

## 2019-05-06 NOTE — PATIENT INSTRUCTIONS
Pneumonia: Care Instructions  Your Care Instructions    Pneumonia is an infection of the lungs. Most cases are caused by infections from bacteria or viruses. Pneumonia may be mild or very severe. If it is caused by bacteria, you will be treated with antibiotics. It may take a few weeks to a few months to recover fully from pneumonia, depending on how sick you were and whether your overall health is good. Follow-up care is a key part of your treatment and safety. Be sure to make and go to all appointments, and call your doctor if you are having problems. It's also a good idea to know your test results and keep a list of the medicines you take. How can you care for yourself at home? · Take your antibiotics exactly as directed. Do not stop taking the medicine just because you are feeling better. You need to take the full course of antibiotics. · Take your medicines exactly as prescribed. Call your doctor if you think you are having a problem with your medicine. · Get plenty of rest and sleep. You may feel weak and tired for a while, but your energy level will improve with time. · To prevent dehydration, drink plenty of fluids, enough so that your urine is light yellow or clear like water. Choose water and other caffeine-free clear liquids until you feel better. If you have kidney, heart, or liver disease and have to limit fluids, talk with your doctor before you increase the amount of fluids you drink. · Take care of your cough so you can rest. A cough that brings up mucus from your lungs is common with pneumonia. It is one way your body gets rid of the infection. But if coughing keeps you from resting or causes severe fatigue and chest-wall pain, talk to your doctor. He or she may suggest that you take a medicine to reduce the cough. · Use a vaporizer or humidifier to add moisture to your bedroom. Follow the directions for cleaning the machine. · Do not smoke or allow others to smoke around you.  Smoke will make your cough last longer. If you need help quitting, talk to your doctor about stop-smoking programs and medicines. These can increase your chances of quitting for good. · Take an over-the-counter pain medicine, such as acetaminophen (Tylenol), ibuprofen (Advil, Motrin), or naproxen (Aleve). Read and follow all instructions on the label. · Do not take two or more pain medicines at the same time unless the doctor told you to. Many pain medicines have acetaminophen, which is Tylenol. Too much acetaminophen (Tylenol) can be harmful. · If you were given a spirometer to measure how well your lungs are working, use it as instructed. This can help your doctor tell how your recovery is going. · To prevent pneumonia in the future, talk to your doctor about getting a flu vaccine (once a year) and a pneumococcal vaccine (one time only for most people). When should you call for help? Call 911 anytime you think you may need emergency care. For example, call if:    · You have severe trouble breathing.    Call your doctor now or seek immediate medical care if:    · You cough up dark brown or bloody mucus (sputum).     · You have new or worse trouble breathing.     · You are dizzy or lightheaded, or you feel like you may faint.    Watch closely for changes in your health, and be sure to contact your doctor if:    · You have a new or higher fever.     · You are coughing more deeply or more often.     · You are not getting better after 2 days (48 hours).     · You do not get better as expected. Where can you learn more? Go to http://dong-stanislaw.info/. Enter [id number] in the search box to learn more about \"Pneumonia: Care Instructions. \"  Current as of: September 5, 2018  Content Version: 11.9  © 5637-7894 [employer]. Care instructions adapted under license by [employer] (which disclaims liability or warranty for this information).  If you have questions about a medical condition or this instruction, always ask your healthcare professional. [address] any warranty or liability for your use of this information. Please follow-up with your PCP in 2-3 days         Fatigue: Care Instructions  Your Care Instructions    Fatigue is a feeling of tiredness, exhaustion, or lack of energy. You may feel fatigue because of too much or not enough activity. It can also come from stress, lack of sleep, boredom, and poor diet. Many medical problems, such as viral infections, can cause fatigue. Emotional problems, especially depression, are often the cause of fatigue. Fatigue is most often a symptom of another problem. Treatment for fatigue depends on the cause. For example, if you have fatigue because you have a certain health problem, treating this problem also treats your fatigue. If depression or anxiety is the cause, treatment may help. Follow-up care is a key part of your treatment and safety. Be sure to make and go to all appointments, and call your doctor if you are having problems. It's also a good idea to know your test results and keep a list of the medicines you take. How can you care for yourself at home? · Get regular exercise. But don't overdo it. Go back and forth between rest and exercise. · Get plenty of rest.  · Eat a healthy diet. Do not skip meals, especially breakfast.  · Reduce your use of caffeine, tobacco, and alcohol. Caffeine is most often found in coffee, tea, cola drinks, and chocolate. · Limit medicines that can cause fatigue. This includes tranquilizers and cold and allergy medicines. When should you call for help? Watch closely for changes in your health, and be sure to contact your doctor if:    · You have new symptoms such as fever or a rash.     · Your fatigue gets worse.     · You have been feeling down, depressed, or hopeless.  Or you may have lost interest in things that you usually enjoy.     · You are not getting better as expected. Where can you learn more? Go to http://dong-stanislaw.info/. Enter P496 in the search box to learn more about \"Fatigue: Care Instructions. \"  Current as of: September 23, 2018  Content Version: 11.9  © 8440-2030 [employer]. Care instructions adapted under license by [employer] (which disclaims liability or warranty for this information). If you have questions about a medical condition or this instruction, always ask your healthcare professional. [place] [address] any warranty or liability for your use of this information.

## 2019-05-07 LAB
ALBUMIN SERPL-MCNC: 5.4 G/DL (ref 3.5–5.5)
ALBUMIN/GLOB SERPL: 1.7 {RATIO} (ref 1.2–2.2)
ALP SERPL-CCNC: 100 IU/L (ref 39–117)
ALT SERPL-CCNC: 15 IU/L (ref 0–32)
AST SERPL-CCNC: 26 IU/L (ref 0–40)
BASOPHILS # BLD AUTO: 0 X10E3/UL (ref 0–0.2)
BASOPHILS NFR BLD AUTO: 1 %
BILIRUB SERPL-MCNC: 0.8 MG/DL (ref 0–1.2)
BUN SERPL-MCNC: 21 MG/DL (ref 6–24)
BUN/CREAT SERPL: 26 (ref 9–23)
CALCIUM SERPL-MCNC: 10.3 MG/DL (ref 8.7–10.2)
CHLORIDE SERPL-SCNC: 96 MMOL/L (ref 96–106)
CO2 SERPL-SCNC: 23 MMOL/L (ref 20–29)
CREAT SERPL-MCNC: 0.82 MG/DL (ref 0.57–1)
EOSINOPHIL # BLD AUTO: 0 X10E3/UL (ref 0–0.4)
EOSINOPHIL NFR BLD AUTO: 0 %
ERYTHROCYTE [DISTWIDTH] IN BLOOD BY AUTOMATED COUNT: 13.7 % (ref 12.3–15.4)
GLOBULIN SER CALC-MCNC: 3.1 G/DL (ref 1.5–4.5)
GLUCOSE SERPL-MCNC: 101 MG/DL (ref 65–99)
HCT VFR BLD AUTO: 43.5 % (ref 34–46.6)
HGB BLD-MCNC: 14.8 G/DL (ref 11.1–15.9)
IMM GRANULOCYTES # BLD AUTO: 0 X10E3/UL (ref 0–0.1)
IMM GRANULOCYTES NFR BLD AUTO: 0 %
LYMPHOCYTES # BLD AUTO: 1.3 X10E3/UL (ref 0.7–3.1)
LYMPHOCYTES NFR BLD AUTO: 16 %
MCH RBC QN AUTO: 29.2 PG (ref 26.6–33)
MCHC RBC AUTO-ENTMCNC: 34 G/DL (ref 31.5–35.7)
MCV RBC AUTO: 86 FL (ref 79–97)
MONOCYTES # BLD AUTO: 0.8 X10E3/UL (ref 0.1–0.9)
MONOCYTES NFR BLD AUTO: 10 %
NEUTROPHILS # BLD AUTO: 5.8 X10E3/UL (ref 1.4–7)
NEUTROPHILS NFR BLD AUTO: 73 %
PLATELET # BLD AUTO: 285 X10E3/UL (ref 150–379)
POTASSIUM SERPL-SCNC: 4 MMOL/L (ref 3.5–5.2)
PROT SERPL-MCNC: 8.5 G/DL (ref 6–8.5)
RBC # BLD AUTO: 5.06 X10E6/UL (ref 3.77–5.28)
SODIUM SERPL-SCNC: 143 MMOL/L (ref 134–144)
WBC # BLD AUTO: 7.9 X10E3/UL (ref 3.4–10.8)

## 2019-05-08 NOTE — PROGRESS NOTES
Please inform pt that labs results are all essentially normal.  Normal blood counts, BUN slightly high (likely due to dehydration), normal creatinine, normal liver enzymes, glucose/electrolytes are stable. Recommend fluids and rest.  Follow-up with PCP in ~ 2 days. Thanks.

## 2019-05-08 NOTE — PROGRESS NOTES
Spoke to patient with two identifiers confirmed. Relayed results as noted from NP. Pt expressed understanding and had no questions. Pt says that she was just about to call to make a follow up appointment with her PCP.

## 2019-06-25 ENCOUNTER — OFFICE VISIT (OUTPATIENT)
Dept: GYNECOLOGY | Age: 59
End: 2019-06-25

## 2019-06-25 ENCOUNTER — HOSPITAL ENCOUNTER (OUTPATIENT)
Dept: LAB | Age: 59
Discharge: HOME | End: 2019-06-25
Payer: COMMERCIAL

## 2019-06-25 VITALS
BODY MASS INDEX: 23.91 KG/M2 | HEART RATE: 78 BPM | SYSTOLIC BLOOD PRESSURE: 124 MMHG | WEIGHT: 121.8 LBS | HEIGHT: 60 IN | DIASTOLIC BLOOD PRESSURE: 89 MMHG

## 2019-06-25 DIAGNOSIS — N89.0 MILD VAGINAL DYSPLASIA: ICD-10-CM

## 2019-06-25 DIAGNOSIS — R87.629 ABNORMAL VAGINAL CYTOLOGY: Primary | ICD-10-CM

## 2019-06-25 PROCEDURE — 88142 CYTOPATH C/V THIN LAYER: CPT

## 2019-06-25 NOTE — PROGRESS NOTES
4 month check up, pt reports no abnormal spotting or bleeding, pt states she has no questions or concerns for today's visit    1. Have you been to the ER, urgent care clinic since your last visit? Hospitalized since your last visit?  no    2. Have you seen or consulted any other health care providers outside of the [address] [place] [address] since your last visit? Include any pap smears or colon screening.    no

## 2019-06-25 NOTE — PROGRESS NOTES
[facility]  [address] [facility], [address], [address]  [phone]  [facility] [phone]       Office Note  Patient ID:  Name: Sita Allan  MRM: [id number]  : 1960/58 y.o. 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 female who presents for continued surveillance for vaginal dysplasia. Previously underwent TLH/BSO, 2015 for a benign pelvic mass. Patient had a history of cervical dysplasia prior to her hysterectomy, although not on final pathology. HSIL pap in 2018, followed by VAIN 1 biopsy 18. F/u pap 18 with ASCUS. Pap 10/2018 and 2019 NILM. Presents today for repeat pap smear. Denies all complaints. Denies vaginal bleeding/discharge, pelvic pain, urinary symptoms, change in appetite or bowel habits. Pathology Review  FINAL PATHOLOGIC DIAGNOSIS  Uterus, bilateral fallopian tubes and ovaries, hysterectomy and bilateral salpingo-oophorectomy:  Uterus:  Cervix: Unremarkable. Endometrium: Atrophic changes. Myometrium: Cellular leiomyoma. Ovaries, bilateral:  Right ovary: Atrophic changes. Serous inclusion cyst.  Left ovary: Serous cystadenofibroma. Fallopian tubes, bilateral:  Right fallopian tube: Benign paratubal cyst.  Left fallopian tube: Benign paratubal cyst.    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 Immunohistochemical stain for P16 fails to show strong diffuse positivity consistent with a low grade lesion. 18:   Vaginal Thin Prep   Satisfactory for evaluation. RARE ATYPICAL SQUAMOUS CELLS OF UNDETERMINED SIGNIFICANCE.        Medications:     Current Outpatient Medications on File Prior to Visit   Medication Sig Dispense Refill    multivitamin (ONE A DAY) tablet Take 1 Tab by mouth daily. No current facility-administered medications on file prior to visit. Allergies:   No Known Allergies  Past Medical History:   Diagnosis Date    Abnormal Pap smear of cervix     ASCUS, HPV +, ? HGSIL, colpo bx negative, LEEP negative    Abnormal Pap smear of cervix 2015    HPV +, 16/18/45 negative    Adverse effect of anesthesia 2018    NAUSEA ONLY; \"LAST TIME THEY GAVE ME THE PATCH; I LIKE THE PATCH\"    Ill-defined condition     recent bout of Bronchitis     Past Surgical History:   Procedure Laterality Date    BREAST SURGERY PROCEDURE UNLISTED      breast biopsy left     HX BREAST BIOPSY Left 16    benign per pt    HX CARPAL TUNNEL RELEASE      HX GYN      2 vaginal births    HX GYN      tubal ligation    HX GYN      hysterectomy     HX LEEP PROCEDURE  14     Social History     Socioeconomic History    Marital status:      Spouse name: Not on file    Number of children: Not on file    Years of education: Not on file    Highest education level: Not on file   Occupational History    Not on file   Social Needs    Financial resource strain: Not on file    Food insecurity:     Worry: Not on file     Inability: Not on file    Transportation needs:     Medical: Not on file     Non-medical: Not on file   Tobacco Use    Smoking status: Former Smoker     Packs/day: 0.50     Years: 5.00     Pack years: 2.50     Last attempt to quit: 3/6/1986     Years since quittin.3    Smokeless tobacco: Never Used   Substance and Sexual Activity    Alcohol use: No     Alcohol/week: 0.0 oz    Drug use: No    Sexual activity: Not on file   Lifestyle    Physical activity:     Days per week: Not on file     Minutes per session: Not on file    Stress: Not on file   Relationships    Social connections:     Talks on phone: Not on file     Gets together: Not on file     Attends Sabianism service: Not on file     Active member of club or organization: Not on file     Attends meetings of clubs or organizations: Not on file     Relationship status: Not on file    Intimate partner violence:     Fear of current or ex partner: Not on file     Emotionally abused: Not on file     Physically abused: Not on file     Forced sexual activity: Not on file   Other Topics Concern    Not on file   Social History Narrative    Not on file       OBJECTIVE:    Vitals:   Vitals:    06/25/19 1118   BP: 124/89   Pulse: 78   Weight: 121 lb 12.8 oz (55.2 kg)   Height: 5' (1.524 m)     Physical Exam:  General: Alert and oriented. No acute distress. Well-nourished  HEENT: No thyroid enlargment. NO JVD. Neck supple without restrictions. Sclera normal. Normal occular motion. Moist mucous membranes. Lymphatics: No evidence of axillary, cervical, or subclavicular adenopathy. Respiratory: clear to auscultation and percussion to the bases. No CVAT. Cardiovascular: regular rate and rhythm. No murmurs, rubs, or gallops. Gastrointestinal: soft, non-tender, non-distended, no masses or organomegaly. Well-healed incision. Musculoskeletal: normal gait. No joint tenderness, deformity or swelling. No muscular tenderness. Extremities: extremities normal, atraumatic, no cyanosis or edema. Pelvic: exam chaperoned by nurse. Normal appearing external genitalia. On speculum exam, the vagina is atrophic. The uterus and cervix are surgically absent. No evidence of masses or nodularity on bimanual exam. Deferred rectovaginal exam. Cytology collected  Neuro: Grossly intact. Normal gait and movement. No acute deficit  Skin: No evidence of rashes or skin changes. IMPRESSION/PLAN:    Ms. Adis Shell is a 60 y.o.  female who presents for continued surveillance for vaginal dysplasia. Previously underwent TLH/BSO, 6/22/2015. HSIL pap 4/2018, followed by VAIN 1 biopsy 5/2018.  F/u pap 7/2018 consistent with ASCUS. Pap 10/2018 and 2/2019 NILM. Problem List Items Addressed This Visit        Reproductive    Mild vaginal dysplasia      Other Visit Diagnoses     Abnormal vaginal cytology    -  Primary    Relevant Orders    PAP, LIQUID BASED, MANUAL SCREEN        Reviewed patient's course to date. KYREE on exam today. Reassured patient of normal exam today. Cytology collected. If low-grade or normal, RTC in 6 months. Reviewed precautionary symptoms to return to clinic sooner. All questions and concerns were addressed with the patient and she is comfortable with the plan. Blas Leal MD      CC:   Eduin Paul MD   No ref.  provider found

## 2019-06-28 NOTE — PROGRESS NOTES
Ryan York,     Ms. Lizandro Garth [facility]'s pap smear has rare atypical cells. Will continue to follow-up as we discussed in clinic.      Thanks,    Abril Chin MD

## 2019-07-09 NOTE — PROGRESS NOTES
Patient notified of results, pt verbalized understanding of results and MD's recommendations, pt has an appt for 12/17/19, tickled for follow up

## 2020-01-08 ENCOUNTER — HOSPITAL ENCOUNTER (OUTPATIENT)
Dept: LAB | Age: 60
Discharge: HOME | End: 2020-01-08
Payer: COMMERCIAL

## 2020-01-08 ENCOUNTER — OFFICE VISIT (OUTPATIENT)
Dept: GYNECOLOGY | Age: 60
End: 2020-01-08

## 2020-01-08 VITALS
SYSTOLIC BLOOD PRESSURE: 108 MMHG | WEIGHT: 133.6 LBS | BODY MASS INDEX: 26.23 KG/M2 | DIASTOLIC BLOOD PRESSURE: 72 MMHG | HEIGHT: 60 IN | HEART RATE: 96 BPM

## 2020-01-08 DIAGNOSIS — N89.0 MILD VAGINAL DYSPLASIA: Primary | ICD-10-CM

## 2020-01-08 DIAGNOSIS — R19.00 PELVIC MASS: ICD-10-CM

## 2020-01-08 DIAGNOSIS — R89.6 ABNORMAL CYTOLOGY: ICD-10-CM

## 2020-01-08 PROCEDURE — 88142 CYTOPATH C/V THIN LAYER: CPT

## 2020-01-08 NOTE — PROGRESS NOTES
6 month check up, pt reports no abnormal spotting or bleeding, pt states she has no questions or concerns for today's visit    1. Have you been to the ER, urgent care clinic since your last visit? Hospitalized since your last visit?  no    2. Have you seen or consulted any other health care providers outside of the [address] [place] [address] since your last visit? Include any pap smears or colon screening.    no

## 2020-01-08 NOTE — PROGRESS NOTES
[facility]  [address] [facility], [address], [address]  [phone]  [facility] [phone]       Office Note  Patient ID:  Name: Ambrocio Araiza  MRM: [id number]  : 1960/59 y.o. 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 female who presents for continued surveillance for vaginal dysplasia. Previously underwent TLH/BSO, 2015 for a benign pelvic mass. Patient had a history of cervical dysplasia prior to her hysterectomy, although not on final pathology. HSIL pap in 2018, followed by VAIN 1 biopsy 18. F/u pap 18 with ASCUS. Pap 10/2018 and 2019 NILM. Pap 2019 with rare atypical cells of undetermined significance. Presents today for repeat pap smear. Denies all complaints. Denies vaginal bleeding/discharge, pelvic pain, urinary symptoms, change in appetite or bowel habits. Pathology Review  FINAL PATHOLOGIC DIAGNOSIS  Uterus, bilateral fallopian tubes and ovaries, hysterectomy and bilateral salpingo-oophorectomy:  Uterus:  Cervix: Unremarkable. Endometrium: Atrophic changes. Myometrium: Cellular leiomyoma. Ovaries, bilateral:  Right ovary: Atrophic changes. Serous inclusion cyst.  Left ovary: Serous cystadenofibroma. Fallopian tubes, bilateral:  Right fallopian tube: Benign paratubal cyst.  Left fallopian tube: Benign paratubal cyst.    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 Immunohistochemical stain for P16 fails to show strong diffuse positivity consistent with a low grade lesion. 18:   Vaginal Thin Prep   Satisfactory for evaluation. RARE ATYPICAL SQUAMOUS CELLS OF UNDETERMINED SIGNIFICANCE. Medications:     Current Outpatient Medications on File Prior to Visit   Medication Sig Dispense Refill    multivitamin (ONE A DAY) tablet Take 1 Tab by mouth daily. No current facility-administered medications on file prior to visit. Allergies:   No Known Allergies  Past Medical History:   Diagnosis Date    Abnormal Pap smear of cervix     ASCUS, HPV +, ? HGSIL, colpo bx negative, LEEP negative    Abnormal Pap smear of cervix 2015    HPV +, 16/18/45 negative    Adverse effect of anesthesia 2018    NAUSEA ONLY; \"LAST TIME THEY GAVE ME THE PATCH; I LIKE THE PATCH\"    Ill-defined condition     recent bout of Bronchitis     Past Surgical History:   Procedure Laterality Date    BREAST SURGERY PROCEDURE UNLISTED      breast biopsy left     HX BREAST BIOPSY Left 16    benign per pt    HX CARPAL TUNNEL RELEASE      HX GYN      2 vaginal births    HX GYN      tubal ligation    HX GYN      hysterectomy     HX LEEP PROCEDURE  14     Social History     Socioeconomic History    Marital status:      Spouse name: Not on file    Number of children: Not on file    Years of education: Not on file    Highest education level: Not on file   Occupational History    Not on file   Social Needs    Financial resource strain: Not on file    Food insecurity:     Worry: Not on file     Inability: Not on file    Transportation needs:     Medical: Not on file     Non-medical: Not on file   Tobacco Use    Smoking status: Former Smoker     Packs/day: 0.50     Years: 5.00     Pack years: 2.50     Last attempt to quit: 3/6/1986     Years since quittin.8    Smokeless tobacco: Never Used   Substance and Sexual Activity    Alcohol use: No     Alcohol/week: 0.0 standard drinks    Drug use: No    Sexual activity: Not on file   Lifestyle    Physical activity:     Days per week: Not on file     Minutes per session: Not on file    Stress: Not on file   Relationships  Social connections:     Talks on phone: Not on file     Gets together: Not on file     Attends Orthodoxy service: Not on file     Active member of club or organization: Not on file     Attends meetings of clubs or organizations: Not on file     Relationship status: Not on file    Intimate partner violence:     Fear of current or ex partner: Not on file     Emotionally abused: Not on file     Physically abused: Not on file     Forced sexual activity: Not on file   Other Topics Concern    Not on file   Social History Narrative    Not on file       OBJECTIVE:    Vitals:   Vitals:    01/08/20 1342   BP: 108/72   Pulse: 96   Weight: 133 lb 9.6 oz (60.6 kg)   Height: 5' (1.524 m)     Physical Exam:  General: Alert and oriented. No acute distress. Well-nourished  HEENT: No thyroid enlargment. NO JVD. Neck supple without restrictions. Sclera normal. Normal occular motion. Moist mucous membranes. Lymphatics: No evidence of axillary, cervical, or subclavicular adenopathy. Respiratory: clear to auscultation and percussion to the bases. No CVAT. Cardiovascular: regular rate and rhythm. No murmurs, rubs, or gallops. Gastrointestinal: soft, non-tender, non-distended, no masses or organomegaly. Well-healed incision. Musculoskeletal: normal gait. No joint tenderness, deformity or swelling. No muscular tenderness. Extremities: extremities normal, atraumatic, no cyanosis or edema. Pelvic: exam chaperoned by nurse. Normal appearing external genitalia. On speculum exam, the vagina is atrophic. Vaginal pap collected. The uterus and cervix are surgically absent. No evidence of masses or nodularity on bimanual exam. Deferred rectovaginal exam. Cytology collected  Neuro: Grossly intact. Normal gait and movement. No acute deficit  Skin: No evidence of rashes or skin changes. IMPRESSION/PLAN:    Ms. Sherrell Klein is a 60 y.o.  female who presents for continued surveillance for vaginal dysplasia.  Previously underwent TLH/BSO, 6/22/2015. HSIL pap 4/2018, followed by VAIN 1 biopsy 5/2018. F/u pap 7/2018 consistent with ASCUS. Pap 10/2018 and 2/2019 NILM. Pap 6/2019 with rare atypical cells of undetermined significance. Problem List Items Addressed This Visit        Reproductive    Mild vaginal dysplasia - Primary       Other    Abnormal cytology    Pelvic mass        Reviewed patient's course to date. KYREE on exam today. Reassured patient of normal exam today. Cytology collected. If low-grade or normal, RTC in 6 months. Reviewed precautionary symptoms to return to clinic sooner. All questions and concerns were addressed with the patient and she is comfortable with the plan. Ayanna Zendejas MD      CC:   Latasha Ramirez MD   No ref.  provider found

## 2020-01-13 NOTE — PROGRESS NOTES
Izella Patient,     Ms. Lizette Barnett Silva's pap smear is normal.     Thanks,    Callum Moreira MD

## 2020-01-19 NOTE — PROGRESS NOTES
Pt cooperative with care, safety measures in place   Subjective:      Elma Sanchez is a 61 y.o. female with chief complaint of vomiting and diarrhea. Started 3 days ago . Diarrhea resolved but still vomiting. Last episode of vomiting this morning, stomach contents no blood. Last BM this AM loose but not watery. She denies fever but has chills. She has not tried any meds so far. She is unable to keep anything down due to nausea. Denies any sick contacts but she works at the N[employer]. Recent travel: no  Sick contacts: None  Self treatments: None  Blood in stool: no    Past Medical History   Diagnosis Date    Abnormal Pap smear of cervix 2014     ASCUS, HPV +, ? HGSIL, colpo bx negative, LEEP negative    Abnormal Pap smear of cervix 1/2015     HPV +, 16/18/45 negative    Ill-defined condition      recent bout of Bronchitis     Current Outpatient Prescriptions   Medication Sig Dispense Refill    multivitamin (ONE A DAY) tablet Take 1 Tab by mouth daily.  guaiFENesin-codeine (GUAIATUSSIN AC)  mg/5 mL solution Take 5 mL by mouth three (3) times daily as needed for Cough. Max Daily Amount: 15 mL. 118 mL 0    naproxen (NAPROSYN) 500 mg tablet Take 500 mg by mouth two (2) times daily (with meals). No Known Allergies    Review of Systems. General/Constitutional:   No fever, weight changes, or headache. Mouth: No sore throat. Neck: No stiffness, pain, or limited movement. Cardiac: No chest pain, palpitations . Respiratory:  No cough, shortness of breath, dyspnea on exertion. GI: As per HPI   Skin: No rash. Musculoskeletal: No weakness, myalgias, or  arthralgias. Neurological: No loss of consciousness, dizziness, or cognitive changes.     Objective:     Visit Vitals    /74 (BP 1 Location: Right arm, BP Patient Position: Sitting)    Pulse (!) 101    Temp 98.2 °F (36.8 °C) (Oral)    Resp 16    Ht 5' (1.524 m)    Wt 124 lb 9.6 oz (56.5 kg)    SpO2 99%    BMI 24.33 kg/m2       General: Alert and oriented and in no acute distress. Responds to all questions appropriately. SKIN: No obvious rash. HEENT: PERRL, no oral lesion or exudates, TM clear without effusion bilaterally. No cervical lymphadenopathy. Thyroid non-tender, no david, normal size. LUNGS: Respirations unlabored; clear to auscultation bilaterally. CARDIOVASCULAR: Regular, rate, and rhythm without murmurs, gallops or rubs. ABDOMEN: Diffuse mild tenderness to deep palpation. Soft; nondistended; normoactive bowel sounds; no masses or organomegaly. No rebound or guarding. EXTREMITIES: No edema, well perfused, moving all extremities equally. NEUROLOGIC: Speech intact; face symmetrical.      Assessment:       ICD-10-CM ICD-9-CM    1. Acute gastroenteritis K52.9 558.9 ondansetron (ZOFRAN ODT) 4 mg disintegrating tablet     Control nausea with zofran, advance PO as tolerated. GENERAL INSTRUCTIONS:  1. Plenty of fluids:    Infants: Pedialyte    Adults: Water, Gatorade, decaffeinated defizzed soda, ice chips, etc.    Wait 30-60 min after vomiting to try again. 2. As nausea subsides, advance food as tolerated to bananas, rice, applesauce, toast, tea, and then to a regular diet or full-strength formula  3. No milk, meat, fried/fatty products, aspirin or ibuprofen for several days. Return to clinic or go to the Emergency Room if you have increased diarrhea, vomiting, fever, pain, swelling of the abdomen, bloody stool or vomitus, or signs of dehydration: decreased urination, dry mouth, fatigue, lightheaded, (suken soft spot, no tears, dry diapers in infants) or no improvement in 48 hours .

## 2020-03-02 ENCOUNTER — OFFICE VISIT (OUTPATIENT)
Dept: PRIMARY CARE CLINIC | Age: 60
End: 2020-03-02

## 2020-03-02 VITALS
OXYGEN SATURATION: 99 % | DIASTOLIC BLOOD PRESSURE: 84 MMHG | BODY MASS INDEX: 24.17 KG/M2 | HEIGHT: 61 IN | HEART RATE: 92 BPM | WEIGHT: 128 LBS | TEMPERATURE: 98.2 F | SYSTOLIC BLOOD PRESSURE: 125 MMHG | RESPIRATION RATE: 22 BRPM

## 2020-03-02 DIAGNOSIS — J02.9 SORE THROAT: Primary | ICD-10-CM

## 2020-03-02 LAB
S PYO AG THROAT QL: NEGATIVE
VALID INTERNAL CONTROL?: YES

## 2020-03-02 RX ORDER — AMOXICILLIN 875 MG/1
875 TABLET, FILM COATED ORAL 2 TIMES DAILY
Qty: 14 TAB | Refills: 0 | Status: SHIPPED | OUTPATIENT
Start: 2020-03-02 | End: 2021-02-16 | Stop reason: ALTCHOICE

## 2020-03-02 NOTE — PROGRESS NOTES
HISTORY OF PRESENT ILLNESS  Ambrocio Araiza is a 60 y.o. female. Cough   The history is provided by the patient. The current episode started more than 2 days ago. The problem has been gradually worsening. Pertinent negatives include no chest pain, no headaches and no shortness of breath. Nothing aggravates the symptoms. The treatment provided moderate relief. Review of Systems   Constitutional: Negative for chills, fever and weight loss. Respiratory: Positive for cough. Negative for shortness of breath. Cardiovascular: Negative for chest pain and palpitations. Musculoskeletal: Negative for back pain, falls, joint pain, myalgias and neck pain. Skin: Negative for rash. Neurological: Negative for dizziness, tingling, tremors, sensory change, focal weakness, weakness and headaches. Psychiatric/Behavioral: Negative for depression, hallucinations, substance abuse and suicidal ideas. The patient is not nervous/anxious and does not have insomnia. Physical Exam  Constitutional:       General: She is not in acute distress. Appearance: She is not diaphoretic. Eyes:      General: No scleral icterus. Right eye: No discharge. Left eye: No discharge. Conjunctiva/sclera: Conjunctivae normal.      Pupils: Pupils are equal, round, and reactive to light. Neck:      Musculoskeletal: Normal range of motion and neck supple. Thyroid: No thyromegaly. Vascular: No JVD. Trachea: No tracheal deviation. Cardiovascular:      Rate and Rhythm: Regular rhythm. Heart sounds: No murmur. No friction rub. No gallop. Pulmonary:      Effort: Pulmonary effort is normal. No respiratory distress. Breath sounds: Normal breath sounds. No stridor. No wheezing or rales. Chest:      Chest wall: No tenderness. Abdominal:      General: Bowel sounds are normal. There is no distension. Palpations: Abdomen is soft. There is no mass. Tenderness:  There is no abdominal tenderness. There is no guarding or rebound. Musculoskeletal: Normal range of motion. General: No tenderness. Lymphadenopathy:      Cervical: No cervical adenopathy. Skin:     General: Skin is warm. Coloration: Skin is not pale. Findings: No erythema or rash. Neurological:      Mental Status: She is alert and oriented to person, place, and time. Cranial Nerves: No cranial nerve deficit. Motor: No abnormal muscle tone. Coordination: Coordination normal.      Deep Tendon Reflexes: Reflexes are normal and symmetric. Reflexes normal.   Psychiatric:         Behavior: Behavior normal.         Thought Content: Thought content normal.         Judgment: Judgment normal.         ASSESSMENT and PLAN    ICD-10-CM ICD-9-CM    1. Sore throat J02.9 462 AMB POC RAPID STREP A     Orders Placed This Encounter    AMB POC RAPID STREP A    codeine phosphate/guaifenesin (ROBITUSSIN A-C PO)     Sig: Take  by mouth.  amoxicillin (AMOXIL) 875 mg tablet     Sig: Take 1 Tab by mouth two (2) times a day. Dispense:  14 Tab     Refill:  0   I have discussed the diagnosis with the patient and the intended plan as seen in the above orders. The patient has received an after-visit summary and questions were answered concerning future plans. I have discussed medication side effects and warnings with the patient as well. Follow-up Disposition:  Advised ER if symptoms worsen or fail to improve.

## 2020-03-02 NOTE — PATIENT INSTRUCTIONS
Sinusitis: Care Instructions  Your Care Instructions    Sinusitis is an infection of the lining of the sinus cavities in your head. Sinusitis often follows a cold. It causes pain and pressure in your head and face. In most cases, sinusitis gets better on its own in 1 to 2 weeks. But some mild symptoms may last for several weeks. Sometimes antibiotics are needed. Follow-up care is a key part of your treatment and safety. Be sure to make and go to all appointments, and call your doctor if you are having problems. It's also a good idea to know your test results and keep a list of the medicines you take. How can you care for yourself at home? · Take an over-the-counter pain medicine, such as acetaminophen (Tylenol), ibuprofen (Advil, Motrin), or naproxen (Aleve). Read and follow all instructions on the label. · If the doctor prescribed antibiotics, take them as directed. Do not stop taking them just because you feel better. You need to take the full course of antibiotics. · Be careful when taking over-the-counter cold or flu medicines and Tylenol at the same time. Many of these medicines have acetaminophen, which is Tylenol. Read the labels to make sure that you are not taking more than the recommended dose. Too much acetaminophen (Tylenol) can be harmful. · Breathe warm, moist air from a steamy shower, a hot bath, or a sink filled with hot water. Avoid cold, dry air. Using a humidifier in your home may help. Follow the directions for cleaning the machine. · Use saline (saltwater) nasal washes to help keep your nasal passages open and wash out mucus and bacteria. You can buy saline nose drops at a grocery store or drugstore. Or you can make your own at home by adding 1 teaspoon of salt and 1 teaspoon of baking soda to 2 cups of distilled water. If you make your own, fill a bulb syringe with the solution, insert the tip into your nostril, and squeeze gently. Len Rolls your nose.   · Put a hot, wet towel or a warm gel pack on your face 3 or 4 times a day for 5 to 10 minutes each time. · Try a decongestant nasal spray like oxymetazoline (Afrin). Do not use it for more than 3 days in a row. Using it for more than 3 days can make your congestion worse. When should you call for help? Call your doctor now or seek immediate medical care if:    · You have new or worse swelling or redness in your face or around your eyes.     · You have a new or higher fever.    Watch closely for changes in your health, and be sure to contact your doctor if:    · You have new or worse facial pain.     · The mucus from your nose becomes thicker (like pus) or has new blood in it.     · You are not getting better as expected. Where can you learn more? Go to http://dong-stanislaw.info/. Enter M838 in the search box to learn more about \"Sinusitis: Care Instructions. \"  Current as of: October 21, 2018  Content Version: 12.2  © 9952-4218 [employer]. Care instructions adapted under license by [employer] (which disclaims liability or warranty for this information). If you have questions about a medical condition or this instruction, always ask your healthcare professional. [place] [address] any warranty or liability for your use of this information.

## 2020-03-02 NOTE — PROGRESS NOTES
Mary Corral is a 60 y.o. female    Room:5    Chief Complaint   Patient presents with    Cough     Pt States \" cold, flow grade fever , hacking cough has been rutussin ac with codeine , started last thursday sore throat and congestion while laying down\". Visit Vitals  /84 (BP 1 Location: Left arm, BP Patient Position: Sitting)   Pulse 92   Temp 98.2 °F (36.8 °C) (Oral)   Resp 22   Ht 5' 1\" (1.549 m)   Wt 128 lb (58.1 kg)   SpO2 99%   BMI 24.19 kg/m²       Pain Scale: 0 - No pain/10    1. Have you been to the ER, urgent care clinic since your last visit? Hospitalized since your last visit? No    2. Have you seen or consulted any other health care providers outside of the [facility] since your last visit? Include any pap smears or colon screening.  No

## 2020-07-15 ENCOUNTER — OFFICE VISIT (OUTPATIENT)
Dept: GYNECOLOGY | Age: 60
End: 2020-07-15

## 2020-07-15 VITALS
TEMPERATURE: 95.7 F | BODY MASS INDEX: 25.37 KG/M2 | DIASTOLIC BLOOD PRESSURE: 73 MMHG | HEART RATE: 96 BPM | HEIGHT: 61 IN | WEIGHT: 134.4 LBS | SYSTOLIC BLOOD PRESSURE: 136 MMHG

## 2020-07-15 DIAGNOSIS — R89.6 ABNORMAL CYTOLOGY: ICD-10-CM

## 2020-07-15 DIAGNOSIS — R87.629 ABNORMAL VAGINAL CYTOLOGY: Primary | ICD-10-CM

## 2020-07-15 DIAGNOSIS — N89.0 MILD VAGINAL DYSPLASIA: ICD-10-CM

## 2020-07-15 DIAGNOSIS — Z86.001 HISTORY OF CARCINOMA IN SITU OF CERVIX: ICD-10-CM

## 2020-07-15 NOTE — PROGRESS NOTES
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 Have you been to the ER, urgent care clinic since your last visit? Hospitalized since your last visit?  no    2. Have you seen or consulted any other health care providers outside of the [address] [place] [address] since your last visit? Include any pap smears or colon screening.    no

## 2020-07-15 NOTE — PROGRESS NOTES
[facility]  [address] [facility], [address], [address]  [phone]  ONQ [phone]       Office Note  Patient ID:  Name: Eugene Irby  MRM: [id number]  : 1960/59 y.o. 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 female who presents for continued surveillance for vaginal dysplasia. Previously underwent TLH/BSO, 2015 for a benign pelvic mass. Patient had a history of cervical dysplasia prior to her hysterectomy, although not on final pathology. HSIL pap in 2018, followed by VAIN 1 biopsy 18. F/u pap 18 with ASCUS. Pap 10/2018 and 2019 NILM. Pap 2019 with rare atypical cells of undetermined significance. Pap 2020 normal.     Presents today for repeat pap smear. Denies all complaints. Denies vaginal bleeding/discharge, pelvic pain, urinary symptoms, change in appetite or bowel habits. Pathology Review  FINAL PATHOLOGIC DIAGNOSIS  Uterus, bilateral fallopian tubes and ovaries, hysterectomy and bilateral salpingo-oophorectomy:  Uterus:  Cervix: Unremarkable. Endometrium: Atrophic changes. Myometrium: Cellular leiomyoma. Ovaries, bilateral:  Right ovary: Atrophic changes. Serous inclusion cyst.  Left ovary: Serous cystadenofibroma. Fallopian tubes, bilateral:  Right fallopian tube: Benign paratubal cyst.  Left fallopian tube: Benign paratubal cyst.    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 Immunohistochemical stain for P16 fails to show strong diffuse positivity consistent with a low grade lesion. 18:   Vaginal Thin Prep   Satisfactory for evaluation.    RARE ATYPICAL SQUAMOUS CELLS OF UNDETERMINED SIGNIFICANCE. Medications:     Current Outpatient Medications on File Prior to Visit   Medication Sig Dispense Refill    multivitamin (ONE A DAY) tablet Take 1 Tab by mouth daily.  codeine phosphate/guaifenesin (ROBITUSSIN A-C PO) Take  by mouth.  amoxicillin (AMOXIL) 875 mg tablet Take 1 Tab by mouth two (2) times a day. 14 Tab 0     No current facility-administered medications on file prior to visit. Allergies:   No Known Allergies  Past Medical History:   Diagnosis Date    Abnormal Pap smear of cervix     ASCUS, HPV +, ? HGSIL, colpo bx negative, LEEP negative    Abnormal Pap smear of cervix 2015    HPV +, 16/18/45 negative    Adverse effect of anesthesia 2018    NAUSEA ONLY; \"LAST TIME THEY GAVE ME THE PATCH; I LIKE THE PATCH\"    Ill-defined condition     recent bout of Bronchitis     Past Surgical History:   Procedure Laterality Date    BREAST SURGERY PROCEDURE UNLISTED      breast biopsy left     HX BREAST BIOPSY Left 16    benign per pt    HX CARPAL TUNNEL RELEASE      HX GYN      2 vaginal births    HX GYN      tubal ligation    HX GYN      hysterectomy     HX LEEP PROCEDURE  14     Social History     Socioeconomic History    Marital status:      Spouse name: Not on file    Number of children: Not on file    Years of education: Not on file    Highest education level: Not on file   Occupational History    Not on file   Social Needs    Financial resource strain: Not on file    Food insecurity     Worry: Not on file     Inability: Not on file   [employer] needs     Medical: Not on file     Non-medical: Not on file   Tobacco Use    Smoking status: Former Smoker     Packs/day: 0.50     Years: 5.00     Pack years: 2.50     Last attempt to quit: 3/6/1986     Years since quittin.3    Smokeless tobacco: Never Used   Substance and Sexual Activity    Alcohol use: No     Alcohol/week: 0.0 standard drinks    Drug use: No    Sexual activity: Not on file   Lifestyle    Physical activity     Days per week: Not on file     Minutes per session: Not on file    Stress: Not on file   Relationships    Social connections     Talks on phone: Not on file     Gets together: Not on file     Attends Taoism service: Not on file     Active member of club or organization: Not on file     Attends meetings of clubs or organizations: Not on file     Relationship status: Not on file    Intimate partner violence     Fear of current or ex partner: Not on file     Emotionally abused: Not on file     Physically abused: Not on file     Forced sexual activity: Not on file   Other Topics Concern    Not on file   Social History Narrative    Not on file       OBJECTIVE:    Vitals:   Vitals:    07/15/20 1610   BP: 136/73   Pulse: 96   Temp: (!) 95.7 °F (35.4 °C)   TempSrc: Temporal   Weight: 134 lb 6.4 oz (61 kg)   Height: 5' 1\" (1.549 m)     Physical Exam:  General: Alert and oriented. No acute distress. Well-nourished  HEENT: No thyroid enlargment. NO JVD. Neck supple without restrictions. Sclera normal. Normal occular motion. Moist mucous membranes. Lymphatics: No evidence of axillary, cervical, or subclavicular adenopathy. Respiratory: clear to auscultation and percussion to the bases. No CVAT. Cardiovascular: regular rate and rhythm. No murmurs, rubs, or gallops. Gastrointestinal: soft, non-tender, non-distended, no masses or organomegaly. Well-healed incision. Musculoskeletal: normal gait. No joint tenderness, deformity or swelling. No muscular tenderness. Extremities: extremities normal, atraumatic, no cyanosis or edema. Pelvic: exam chaperoned by nurse. Normal appearing external genitalia. On speculum exam, the vagina is atrophic. Vaginal pap collected. The uterus and cervix are surgically absent. No evidence of masses or nodularity on bimanual exam. Deferred rectovaginal exam. Cytology collected  Neuro: Grossly intact. Normal gait and movement. No acute deficit  Skin: No evidence of rashes or skin changes. IMPRESSION/PLAN:    Ms. Maria Fernanda Bryant is a 60 y.o.  female who presents for continued surveillance for vaginal dysplasia. Previously underwent TLH/BSO, 6/22/2015. HSIL pap 4/2018, followed by VAIN 1 biopsy 5/2018. F/u pap 7/2018 consistent with ASCUS. Pap 10/2018 and 2/2019 NILM. Pap 6/2019 with rare atypical cells of undetermined significance. Pap 1/8/2020 normal.       Problem List Items Addressed This Visit        Reproductive    Mild vaginal dysplasia       Other    Abnormal cytology      Other Visit Diagnoses     Abnormal vaginal cytology    -  Primary    Relevant Orders    PAP, LIQUID BASED, MANUAL SCREEN    History of carcinoma in situ of cervix        Relevant Orders    PAP, LIQUID BASED, MANUAL SCREEN        Reviewed patient's course to date. KYREE on exam today. Reassured patient of normal exam today. Cytology collected. If low-grade or normal, RTC in 6 months. If normal this time and next, will be able to extend to 1 year surveillance. Reviewed precautionary symptoms to return to clinic sooner. All questions and concerns were addressed with the patient and she is comfortable with the plan. Lyudmila Reeves MD      CC:   Luis Keene MD   No ref.  provider found

## 2020-07-16 ENCOUNTER — HOSPITAL ENCOUNTER (OUTPATIENT)
Dept: LAB | Age: 60
Discharge: HOME | End: 2020-07-16
Payer: COMMERCIAL

## 2020-07-16 PROCEDURE — 88142 CYTOPATH C/V THIN LAYER: CPT

## 2020-08-06 ENCOUNTER — EMPLOYEE WELLNESS (OUTPATIENT)
Dept: FAMILY MEDICINE CLINIC | Age: 60
End: 2020-08-06

## 2020-08-06 LAB
CHOLEST SERPL-MCNC: 159 MG/DL
GLUCOSE SERPL-MCNC: 89 MG/DL (ref 65–100)
HDLC SERPL-MCNC: 69 MG/DL
LDLC SERPL CALC-MCNC: 79.8 MG/DL (ref 0–100)
TRIGL SERPL-MCNC: 51 MG/DL (ref ?–150)

## 2021-02-15 NOTE — PROGRESS NOTES
6 month follow up for abnormal vaginal cytology, history of carcinoma in situ of cervix,   pt reports no abnormal spotting or bleeding, pt states she has no questions or concerns for today's visit    1. Have you been to the ER, urgent care clinic since your last visit? Hospitalized since your last visit?  no    2. Have you seen or consulted any other health care providers outside of the [address] [place] [address] since your last visit? Include any pap smears or colon screening.    no

## 2021-02-16 ENCOUNTER — HOSPITAL ENCOUNTER (OUTPATIENT)
Dept: LAB | Age: 61
Discharge: HOME | End: 2021-02-16
Payer: COMMERCIAL

## 2021-02-16 ENCOUNTER — OFFICE VISIT (OUTPATIENT)
Dept: GYNECOLOGY | Age: 61
End: 2021-02-16
Payer: COMMERCIAL

## 2021-02-16 VITALS
SYSTOLIC BLOOD PRESSURE: 107 MMHG | HEIGHT: 61 IN | HEART RATE: 81 BPM | DIASTOLIC BLOOD PRESSURE: 73 MMHG | BODY MASS INDEX: 23.49 KG/M2 | WEIGHT: 124.4 LBS | TEMPERATURE: 96.1 F

## 2021-02-16 DIAGNOSIS — N89.0 MILD VAGINAL DYSPLASIA: Primary | ICD-10-CM

## 2021-02-16 PROCEDURE — 99213 OFFICE O/P EST LOW 20 MIN: CPT | Performed by: OBSTETRICS & GYNECOLOGY

## 2021-02-16 PROCEDURE — 88142 CYTOPATH C/V THIN LAYER: CPT

## 2021-02-16 NOTE — PROGRESS NOTES
[facility] GYNECOLOGIC ONCOLOGY  [address] [place] [address], [address]  [phone]  QYT [phone]       Office Note  Patient ID:  Name: Milton Benavidez  MRM: [id number]  : 1960/60 y.o. 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 female, an established patient, who presents for continued surveillance for vaginal dysplasia. Previously underwent TLH/BSO, 2015 for a benign pelvic mass. Patient had a history of cervical dysplasia prior to her hysterectomy, although not on final pathology. HSIL pap in 2018, followed by VAIN 1 biopsy 18. F/u pap 18 with ASCUS. Pap 10/2018 and 2019 NILM. Pap 2019 with rare atypical cells of undetermined significance. Pap 2020, 2020 normal.     Presents today for repeat pap smear. Denies all complaints. Denies vaginal bleeding/discharge, pelvic pain, urinary symptoms, change in appetite or bowel habits. Pathology Review  FINAL PATHOLOGIC DIAGNOSIS  Uterus, bilateral fallopian tubes and ovaries, hysterectomy and bilateral salpingo-oophorectomy:  Uterus:  Cervix: Unremarkable. Endometrium: Atrophic changes. Myometrium: Cellular leiomyoma. Ovaries, bilateral:  Right ovary: Atrophic changes. Serous inclusion cyst.  Left ovary: Serous cystadenofibroma. Fallopian tubes, bilateral:  Right fallopian tube: Benign paratubal cyst.  Left fallopian tube: Benign paratubal cyst.    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 Immunohistochemical stain for P16 fails to show strong diffuse positivity consistent with a low grade lesion. 18:   Vaginal Thin Prep   Satisfactory for evaluation.    RARE ATYPICAL SQUAMOUS CELLS OF UNDETERMINED SIGNIFICANCE. Medications:     Current Outpatient Medications on File Prior to Visit   Medication Sig Dispense Refill    multivitamin (ONE A DAY) tablet Take 1 Tab by mouth daily. No current facility-administered medications on file prior to visit. Allergies:   No Known Allergies  Past Medical History:   Diagnosis Date    Abnormal Pap smear of cervix     ASCUS, HPV +, ? HGSIL, colpo bx negative, LEEP negative    Abnormal Pap smear of cervix 2015    HPV +, 16/18/45 negative    Adverse effect of anesthesia 2018    NAUSEA ONLY; \"LAST TIME THEY GAVE ME THE PATCH; I LIKE THE PATCH\"    Ill-defined condition     recent bout of Bronchitis     Past Surgical History:   Procedure Laterality Date    HX BREAST BIOPSY Left 16    benign per pt    HX CARPAL TUNNEL RELEASE      HX GYN      2 vaginal births    HX GYN      tubal ligation    HX GYN      hysterectomy     HX LEEP PROCEDURE  14    OK BREAST SURGERY PROCEDURE UNLISTED      breast biopsy left      Social History     Socioeconomic History    Marital status:      Spouse name: Not on file    Number of children: Not on file    Years of education: Not on file    Highest education level: Not on file   Occupational History    Not on file   Social Needs    Financial resource strain: Not on file    Food insecurity     Worry: Not on file     Inability: Not on file    Transportation needs     Medical: Not on file     Non-medical: Not on file   Tobacco Use    Smoking status: Former Smoker     Packs/day: 0.50     Years: 5.00     Pack years: 2.50     Quit date: 3/6/1986     Years since quittin.9    Smokeless tobacco: Never Used   Substance and Sexual Activity    Alcohol use: No     Alcohol/week: 0.0 standard drinks    Drug use: No    Sexual activity: Not on file   Lifestyle    Physical activity     Days per week: Not on file     Minutes per session: Not on file    Stress: Not on file   Relationships    Social connections     Talks on phone: Not on file     Gets together: Not on file     Attends Restorationist service: Not on file     Active member of club or organization: Not on file     Attends meetings of clubs or organizations: Not on file     Relationship status: Not on file    Intimate partner violence     Fear of current or ex partner: Not on file     Emotionally abused: Not on file     Physically abused: Not on file     Forced sexual activity: Not on file   Other Topics Concern    Not on file   Social History Narrative    Not on file       OBJECTIVE:    Vitals:   Vitals:    02/16/21 1459   BP: 107/73   Pulse: 81   Temp: (!) 96.1 °F (35.6 °C)   TempSrc: Temporal   Weight: 124 lb 6.4 oz (56.4 kg)   Height: 5' 1\" (1.549 m)     Physical Exam:  General: Alert and oriented. No acute distress. Well-nourished  HEENT: No thyroid enlargment. NO JVD. Neck supple without restrictions. Sclera normal. Normal occular motion. Moist mucous membranes. Lymphatics: No evidence of axillary, cervical, or subclavicular adenopathy. Respiratory: clear to auscultation and percussion to the bases. No CVAT. Cardiovascular: regular rate and rhythm. No murmurs, rubs, or gallops. Gastrointestinal: soft, non-tender, non-distended, no masses or organomegaly. Well-healed incision. Musculoskeletal: normal gait. No joint tenderness, deformity or swelling. No muscular tenderness. Extremities: extremities normal, atraumatic, no cyanosis or edema. Pelvic: exam chaperoned by nurse. Normal appearing external genitalia. On speculum exam, the vagina is atrophic. Vaginal pap collected. The uterus and cervix are surgically absent. No evidence of masses or nodularity on bimanual exam. Deferred rectovaginal exam. Cytology collected  Neuro: Grossly intact. Normal gait and movement. No acute deficit  Skin: No evidence of rashes or skin changes.      IMPRESSION/PLAN:    Ms. Da Mckenzie is a 63 y.o.  female who presents for continued surveillance for vaginal dysplasia. Previously underwent TLH/BSO, 6/22/2015. HSIL pap 4/2018, followed by VAIN 1 biopsy 5/2018. F/u pap 7/2018 consistent with ASCUS. Pap 10/2018 and 2/2019 NILM. Pap 6/2019 with rare atypical cells of undetermined significance. Pap 1/8/2020 normal.       Problem List Items Addressed This Visit        Reproductive    Mild vaginal dysplasia - Primary    Relevant Orders    PAP, LIQUID BASED, MANUAL SCREEN        Reviewed patient's course to date. KYREE on exam today. Reassured patient of normal exam today. Cytology collected. If low-grade or normal, RTC in 6 months. If normal this time and next, will be able to extend to 1 year surveillance. Reviewed precautionary symptoms to return to clinic sooner. All questions and concerns were addressed with the patient and she is comfortable with the plan. An electronic signature was used to authenticate this note. Tamy Gallagher MD         CC:   Jordana Mckee MD   No ref.  provider found

## 2021-08-17 ENCOUNTER — HOSPITAL ENCOUNTER (OUTPATIENT)
Dept: MAMMOGRAPHY | Age: 61
Discharge: HOME | End: 2021-08-17
Payer: COMMERCIAL

## 2021-08-17 DIAGNOSIS — Z12.31 VISIT FOR SCREENING MAMMOGRAM: ICD-10-CM

## 2021-08-17 PROCEDURE — 77067 SCR MAMMO BI INCL CAD: CPT

## 2021-08-19 LAB
CHOLEST SERPL-MCNC: 174 MG/DL
GLUCOSE SERPL-MCNC: 92 MG/DL (ref 65–100)
HDLC SERPL-MCNC: 87 MG/DL
LDLC SERPL CALC-MCNC: 77.4 MG/DL (ref 0–100)
TRIGL SERPL-MCNC: 48 MG/DL (ref ?–150)

## 2022-03-19 PROBLEM — N89.0 MILD VAGINAL DYSPLASIA: Status: ACTIVE | Noted: 2018-10-23

## 2022-08-11 ENCOUNTER — TRANSCRIBE ORDER (OUTPATIENT)
Dept: SCHEDULING | Age: 62
End: 2022-08-11

## 2022-08-11 DIAGNOSIS — Z12.31 VISIT FOR SCREENING MAMMOGRAM: Primary | ICD-10-CM

## 2022-10-04 ENCOUNTER — HOSPITAL ENCOUNTER (OUTPATIENT)
Dept: MAMMOGRAPHY | Age: 62
Discharge: HOME | End: 2022-10-04
Attending: FAMILY MEDICINE
Payer: COMMERCIAL

## 2022-10-04 DIAGNOSIS — Z12.31 VISIT FOR SCREENING MAMMOGRAM: ICD-10-CM

## 2022-10-04 PROCEDURE — 77067 SCR MAMMO BI INCL CAD: CPT

## 2022-10-18 ENCOUNTER — HOSPITAL ENCOUNTER (OUTPATIENT)
Dept: MAMMOGRAPHY | Age: 62
Discharge: HOME | End: 2022-10-18
Attending: FAMILY MEDICINE
Payer: COMMERCIAL

## 2022-10-18 ENCOUNTER — HOSPITAL ENCOUNTER (OUTPATIENT)
Dept: ULTRASOUND IMAGING | Age: 62
Discharge: HOME | End: 2022-10-18
Attending: FAMILY MEDICINE
Payer: COMMERCIAL

## 2022-10-18 DIAGNOSIS — R92.8 ABNORMAL MAMMOGRAM OF LEFT BREAST: ICD-10-CM

## 2022-10-18 PROCEDURE — 76642 ULTRASOUND BREAST LIMITED: CPT

## 2022-10-18 PROCEDURE — 77061 BREAST TOMOSYNTHESIS UNI: CPT

## 2022-11-01 ENCOUNTER — HOSPITAL ENCOUNTER (OUTPATIENT)
Dept: MAMMOGRAPHY | Age: 62
Discharge: HOME | End: 2022-11-01
Attending: NURSE PRACTITIONER
Payer: COMMERCIAL

## 2022-11-01 ENCOUNTER — HOSPITAL ENCOUNTER (OUTPATIENT)
Dept: ULTRASOUND IMAGING | Age: 62
Discharge: HOME | End: 2022-11-01
Attending: NURSE PRACTITIONER
Payer: COMMERCIAL

## 2022-11-01 DIAGNOSIS — R92.8 ABNORMAL MAMMOGRAM OF LEFT BREAST: ICD-10-CM

## 2022-11-01 DIAGNOSIS — N63.0 BREAST LUMP IN LOWER INNER QUADRANT: ICD-10-CM

## 2022-11-01 PROCEDURE — 19083 BX BREAST 1ST LESION US IMAG: CPT

## 2022-11-01 PROCEDURE — 77065 DX MAMMO INCL CAD UNI: CPT

## 2022-11-01 PROCEDURE — 88305 TISSUE EXAM BY PATHOLOGIST: CPT

## 2022-11-01 RX ORDER — LIDOCAINE HYDROCHLORIDE AND EPINEPHRINE 10; 10 MG/ML; UG/ML
15 INJECTION, SOLUTION INFILTRATION; PERINEURAL ONCE
Status: DISPENSED | OUTPATIENT
Start: 2022-11-01 | End: 2022-11-01

## 2022-11-01 RX ORDER — LIDOCAINE HYDROCHLORIDE 10 MG/ML
15 INJECTION, SOLUTION EPIDURAL; INFILTRATION; INTRACAUDAL; PERINEURAL
Status: DISPENSED | OUTPATIENT
Start: 2022-11-01 | End: 2022-11-01

## 2022-11-01 NOTE — DISCHARGE INSTRUCTIONS
[address]  [phone]      Breast Biopsy Discharge Instructions          1. After the biopsy, we will place a clean covered ice pack over the biopsy site, within the bra - you should leave the ice pack on 30 minutes and then remove the ice pack for 1-2 hours until bedtime. If needed you can continue applying ice the following day. It is a good idea to wear your bra for support, both day and night unless this causes you discomfort. 2. You may take Tylenol (two tablets) every 4 to 6 hours as needed for pain. Do not take aspirin or aspirin products (e.g. ibuprofen, Advil, Motrin) as these may cause more bleeding. 3. You may expect some bruising and skin discoloration in the biopsy area. This is normal and generally should resolve in 5 to 7 days. 4. Most women do not find it necessary to restrict their activities after the procedure. You should rest as needed on the day of your biopsy. The next day, if you are feeling okay, you may resume your regular work/activity schedule. Avoid strenuous activity and heavy lifting, jogging, aerobics, or vacuuming for 48 hours after the procedure. 5. 48 hours after your biopsy, remove the large outer dressing and leave the steri-strips (tiny pieces of tape) in place. The steri-strips will usually fall off in a few days. You may shower 48 hours after your biopsy and you may get the steri-strips wet. If still present after 4 days, you may gently peel the strips off. Keep the area clean and dry and shower daily. 6. If you have bleeding from the incision area, hold firm pressure on the area for 20 minutes. This should control any slight oozing that might occur.   If you develop persistent bleeding or pain which does not respond to the above measures or if you develop a fever, excessive swelling, redness, heat or drainage, please call the Breast Health Navigator at [phone] during normal business hours (7 a.m. - 5 p.m.). After business hours, call [phone] and ask for the on-call Radiology Nurse to be paged, or your referring physician. 7. You will receive your biopsy results (pathology report) in 3-5 business days - the radiologist will review your results and you will receive a call from the radiology department and/or from your doctor.           Physician :  Dr. Wood Bush                                          Nurse: ______________________        [facility]: ______________                                                Mammo: _____________________

## 2022-11-01 NOTE — PROGRESS NOTES
Discharge instructions given to patient by Rn. Pt verbalized understanding of discharge instructions. Pt discharged without difficulty. Pt. Discharged in stable condition via ambulation , accompanied by . Post procedure Mammo completed and reviewed by MD. Pt cleared for discharge. Biopsy site clean and dry, hemostasis achieved. Steri strips with DSD placed. Ice pack held by patient. Pt verbalized she would like to be notified by phone of any results. Pt encouraged to call department if she has not received her results bereket 3-5 business days. Pt advised not answer cell phone while driving. Biopsy Specimen verified with Lucille prior to RN carrying specimen to gross room.

## 2023-10-23 ENCOUNTER — OFFICE VISIT (OUTPATIENT)
Age: 63
End: 2023-10-23

## 2023-10-23 VITALS
OXYGEN SATURATION: 98 % | HEIGHT: 60 IN | BODY MASS INDEX: 27.68 KG/M2 | HEART RATE: 86 BPM | WEIGHT: 141 LBS | SYSTOLIC BLOOD PRESSURE: 140 MMHG | RESPIRATION RATE: 16 BRPM | DIASTOLIC BLOOD PRESSURE: 78 MMHG | TEMPERATURE: 97.6 F

## 2023-10-23 DIAGNOSIS — J40 BRONCHITIS: Primary | ICD-10-CM

## 2023-10-23 DIAGNOSIS — R09.89 UPPER RESPIRATORY SYMPTOM: ICD-10-CM

## 2023-10-23 DIAGNOSIS — J34.89 SINUS PRESSURE: ICD-10-CM

## 2023-10-23 LAB
EXP DATE SOLUTION: NORMAL
EXP DATE SWAB: NORMAL
EXPIRATION DATE: NORMAL
LOT NUMBER POC: NORMAL
LOT NUMBER SOLUTION: NORMAL
LOT NUMBER SWAB: NORMAL
QUICKVUE INFLUENZA TEST: NEGATIVE
SARS-COV-2 RNA, POC: NEGATIVE
VALID INTERNAL CONTROL, POC: YES

## 2023-10-23 RX ORDER — ALBUTEROL SULFATE 90 UG/1
2 AEROSOL, METERED RESPIRATORY (INHALATION) EVERY 4 HOURS PRN
Qty: 18 G | Refills: 0 | Status: SHIPPED | OUTPATIENT
Start: 2023-10-23

## 2023-10-23 RX ORDER — DEXTROMETHORPHAN HYDROBROMIDE AND PROMETHAZINE HYDROCHLORIDE 15; 6.25 MG/5ML; MG/5ML
5 SYRUP ORAL
Qty: 120 ML | Refills: 0 | Status: SHIPPED | OUTPATIENT
Start: 2023-10-23 | End: 2023-10-30

## 2023-10-23 RX ORDER — BENZONATATE 200 MG/1
200 CAPSULE ORAL 3 TIMES DAILY
Qty: 21 CAPSULE | Refills: 0 | Status: SHIPPED | OUTPATIENT
Start: 2023-10-23 | End: 2023-10-30

## 2023-10-23 RX ORDER — METHYLPREDNISOLONE 4 MG/1
TABLET ORAL
Qty: 1 KIT | Refills: 0 | Status: SHIPPED | OUTPATIENT
Start: 2023-10-23

## 2023-10-23 ASSESSMENT — ENCOUNTER SYMPTOMS: COUGH: 1

## 2023-10-23 NOTE — PATIENT INSTRUCTIONS
- Continue using over the counter Coricidin BPH line of cough and decongestant medicine     - Consider using OTC Nasacort daily and a non drowsy allergy medication such as allegra,  zyrtec, or xyzal.     - Consider using nedi pot to help with sinus congestion    - Push plenty of fluids/rest    -  Acetaminophen (Tylenol) only : 500mg 1-2 tablets every 6 hours as needed for pain.     -  Follow-up in 5 days if symptoms worsen or persist.

## 2023-10-26 DIAGNOSIS — J40 BRONCHITIS: Primary | ICD-10-CM

## 2023-10-26 RX ORDER — AMOXICILLIN AND CLAVULANATE POTASSIUM 875; 125 MG/1; MG/1
1 TABLET, FILM COATED ORAL 2 TIMES DAILY
Qty: 20 TABLET | Refills: 0 | Status: SHIPPED | OUTPATIENT
Start: 2023-10-26 | End: 2023-11-05

## (undated) DEVICE — SUTURE MCRYL SZ 5-0 L18IN ABSRB UD PC-3 L16MM 3/8 CIR Y844G

## (undated) DEVICE — BANDAGE COMPR SELF ADH 5 YDX2 IN TAN NS PREMIERPRO LF

## (undated) DEVICE — (D)PREP SKN SCRB EXDINE 4OZ -- DUPE USE ITEM 324303

## (undated) DEVICE — BLADE OPHTH MINI BEAV SHRP --

## (undated) DEVICE — DISPOSABLE TOURNIQUET CUFF SINGLE BLADDER, DUAL PORT AND QUICK CONNECT CONNECTOR: Brand: COLOR CUFF

## (undated) DEVICE — TIBURON HAND DRAPE: Brand: CONVERTORS

## (undated) DEVICE — INFECTION CONTROL KIT SYS

## (undated) DEVICE — BANDAGE COMPR 9 FTX4 IN SMOOTH COMFORTABLE SYNTH ESMRK LF

## (undated) DEVICE — STERILE POLYISOPRENE POWDER-FREE SURGICAL GLOVES: Brand: PROTEXIS

## (undated) DEVICE — Device

## (undated) DEVICE — NEEDLE HYPO 25GA L1.5IN BVL ORIENTED ECLIPSE

## (undated) DEVICE — STANDARD (U) BLADE ASSEMBLY 1PK: Brand: MICROAIRE®

## (undated) DEVICE — GAUZE SPONGES,12 PLY: Brand: CURITY

## (undated) DEVICE — DRAPE,REIN 53X77,STERILE: Brand: MEDLINE

## (undated) DEVICE — DERMABOND SKIN ADH 0.7ML -- DERMABOND ADVANCED 12/BX

## (undated) DEVICE — SYR 10ML LUER LOK 1/5ML GRAD --

## (undated) DEVICE — BIPOLAR FORCEPS CORD,BANANA LEADS: Brand: VALLEYLAB

## (undated) DEVICE — TRAY PREP DRY W/ PREM GLV 2 APPL 6 SPNG 2 UNDPD 1 OVERWRAP

## (undated) DEVICE — SOLUTION IV 1000ML 0.9% SOD CHL

## (undated) DEVICE — SOL ANTI-FOG 6ML MEDC -- MEDICHOICE - CONVERT TO 358427